# Patient Record
Sex: FEMALE | Race: WHITE | Employment: OTHER | URBAN - METROPOLITAN AREA
[De-identification: names, ages, dates, MRNs, and addresses within clinical notes are randomized per-mention and may not be internally consistent; named-entity substitution may affect disease eponyms.]

---

## 2017-05-03 RX ORDER — METHYLPHENIDATE HYDROCHLORIDE 20 MG/1
20 TABLET ORAL 3 TIMES DAILY
COMMUNITY
End: 2022-09-20

## 2017-05-08 ENCOUNTER — ANESTHESIA EVENT (OUTPATIENT)
Dept: ENDOSCOPY | Age: 72
End: 2017-05-08
Payer: MEDICARE

## 2017-05-09 ENCOUNTER — HOSPITAL ENCOUNTER (OUTPATIENT)
Age: 72
Setting detail: OUTPATIENT SURGERY
Discharge: HOME | End: 2017-05-09
Attending: INTERNAL MEDICINE | Admitting: INTERNAL MEDICINE
Payer: MEDICARE

## 2017-05-09 ENCOUNTER — ANESTHESIA (OUTPATIENT)
Dept: ENDOSCOPY | Age: 72
End: 2017-05-09
Payer: MEDICARE

## 2017-05-09 VITALS
RESPIRATION RATE: 19 BRPM | BODY MASS INDEX: 32.2 KG/M2 | TEMPERATURE: 97.5 F | DIASTOLIC BLOOD PRESSURE: 62 MMHG | HEART RATE: 76 BPM | SYSTOLIC BLOOD PRESSURE: 120 MMHG | HEIGHT: 62 IN | WEIGHT: 175 LBS | OXYGEN SATURATION: 100 %

## 2017-05-09 LAB — GLUCOSE BLD STRIP.AUTO-MCNC: 115 MG/DL (ref 70–110)

## 2017-05-09 PROCEDURE — 74011250636 HC RX REV CODE- 250/636: Performed by: NURSE ANESTHETIST, CERTIFIED REGISTERED

## 2017-05-09 PROCEDURE — 76040000019: Performed by: INTERNAL MEDICINE

## 2017-05-09 PROCEDURE — 74011250636 HC RX REV CODE- 250/636

## 2017-05-09 PROCEDURE — 82962 GLUCOSE BLOOD TEST: CPT

## 2017-05-09 PROCEDURE — 76060000031 HC ANESTHESIA FIRST 0.5 HR: Performed by: INTERNAL MEDICINE

## 2017-05-09 RX ORDER — MAGNESIUM SULFATE 100 %
4 CRYSTALS MISCELLANEOUS AS NEEDED
Status: DISCONTINUED | OUTPATIENT
Start: 2017-05-09 | End: 2017-05-09 | Stop reason: HOSPADM

## 2017-05-09 RX ORDER — SODIUM CHLORIDE 0.9 % (FLUSH) 0.9 %
5-10 SYRINGE (ML) INJECTION AS NEEDED
Status: DISCONTINUED | OUTPATIENT
Start: 2017-05-09 | End: 2017-05-09 | Stop reason: HOSPADM

## 2017-05-09 RX ORDER — SODIUM CHLORIDE 0.9 % (FLUSH) 0.9 %
5-10 SYRINGE (ML) INJECTION EVERY 8 HOURS
Status: DISCONTINUED | OUTPATIENT
Start: 2017-05-09 | End: 2017-05-09 | Stop reason: HOSPADM

## 2017-05-09 RX ORDER — PROPOFOL 10 MG/ML
INJECTION, EMULSION INTRAVENOUS AS NEEDED
Status: DISCONTINUED | OUTPATIENT
Start: 2017-05-09 | End: 2017-05-09 | Stop reason: HOSPADM

## 2017-05-09 RX ORDER — SODIUM CHLORIDE, SODIUM LACTATE, POTASSIUM CHLORIDE, CALCIUM CHLORIDE 600; 310; 30; 20 MG/100ML; MG/100ML; MG/100ML; MG/100ML
75 INJECTION, SOLUTION INTRAVENOUS CONTINUOUS
Status: DISCONTINUED | OUTPATIENT
Start: 2017-05-09 | End: 2017-05-09 | Stop reason: HOSPADM

## 2017-05-09 RX ORDER — DEXTROMETHORPHAN/PSEUDOEPHED 2.5-7.5/.8
1.2 DROPS ORAL
Status: CANCELLED | OUTPATIENT
Start: 2017-05-09

## 2017-05-09 RX ORDER — EPINEPHRINE 0.1 MG/ML
1 INJECTION INTRACARDIAC; INTRAVENOUS
Status: CANCELLED | OUTPATIENT
Start: 2017-05-09 | End: 2017-05-09

## 2017-05-09 RX ORDER — FAMOTIDINE 10 MG/ML
20 INJECTION INTRAVENOUS ONCE
Status: DISCONTINUED | OUTPATIENT
Start: 2017-05-09 | End: 2017-05-09 | Stop reason: HOSPADM

## 2017-05-09 RX ORDER — SODIUM CHLORIDE 0.9 % (FLUSH) 0.9 %
5-10 SYRINGE (ML) INJECTION AS NEEDED
Status: CANCELLED | OUTPATIENT
Start: 2017-05-09 | End: 2017-05-09

## 2017-05-09 RX ORDER — ATROPINE SULFATE 0.1 MG/ML
0.5 INJECTION INTRAVENOUS
Status: CANCELLED | OUTPATIENT
Start: 2017-05-09 | End: 2017-05-09

## 2017-05-09 RX ORDER — NALOXONE HYDROCHLORIDE 0.4 MG/ML
0.4 INJECTION, SOLUTION INTRAMUSCULAR; INTRAVENOUS; SUBCUTANEOUS
Status: CANCELLED | OUTPATIENT
Start: 2017-05-09 | End: 2017-05-09

## 2017-05-09 RX ORDER — FLUMAZENIL 0.1 MG/ML
0.2 INJECTION INTRAVENOUS
Status: CANCELLED | OUTPATIENT
Start: 2017-05-09 | End: 2017-05-09

## 2017-05-09 RX ORDER — SODIUM CHLORIDE 0.9 % (FLUSH) 0.9 %
5-10 SYRINGE (ML) INJECTION EVERY 8 HOURS
Status: CANCELLED | OUTPATIENT
Start: 2017-05-09 | End: 2017-05-09

## 2017-05-09 RX ORDER — DEXTROSE 50 % IN WATER (D50W) INTRAVENOUS SYRINGE
25-50 AS NEEDED
Status: DISCONTINUED | OUTPATIENT
Start: 2017-05-09 | End: 2017-05-09 | Stop reason: HOSPADM

## 2017-05-09 RX ORDER — INSULIN LISPRO 100 [IU]/ML
INJECTION, SOLUTION INTRAVENOUS; SUBCUTANEOUS ONCE
Status: DISCONTINUED | OUTPATIENT
Start: 2017-05-09 | End: 2017-05-09 | Stop reason: HOSPADM

## 2017-05-09 RX ADMIN — PROPOFOL 20 MG: 10 INJECTION, EMULSION INTRAVENOUS at 12:44

## 2017-05-09 RX ADMIN — PROPOFOL 50 MG: 10 INJECTION, EMULSION INTRAVENOUS at 12:32

## 2017-05-09 RX ADMIN — PROPOFOL 20 MG: 10 INJECTION, EMULSION INTRAVENOUS at 12:35

## 2017-05-09 RX ADMIN — SODIUM CHLORIDE, SODIUM LACTATE, POTASSIUM CHLORIDE, AND CALCIUM CHLORIDE 75 ML/HR: 600; 310; 30; 20 INJECTION, SOLUTION INTRAVENOUS at 12:18

## 2017-05-09 RX ADMIN — PROPOFOL 100 MG: 10 INJECTION, EMULSION INTRAVENOUS at 12:29

## 2017-05-09 RX ADMIN — PROPOFOL 20 MG: 10 INJECTION, EMULSION INTRAVENOUS at 12:47

## 2017-05-09 RX ADMIN — PROPOFOL 20 MG: 10 INJECTION, EMULSION INTRAVENOUS at 12:38

## 2017-05-09 RX ADMIN — PROPOFOL 20 MG: 10 INJECTION, EMULSION INTRAVENOUS at 12:41

## 2017-05-09 NOTE — ANESTHESIA POSTPROCEDURE EVALUATION
Post-Anesthesia Evaluation and Assessment    Patient: Orlando Cruz MRN: [id number]  SSN: [id number]    YOB: 1945  Age: 71 y.o. Sex: female       Cardiovascular Function/Vital Signs  Visit Vitals    /56    Pulse 79    Temp 36.4 °C (97.5 °F)    Resp 18    Ht 5' 2\" (1.575 m)    Wt 79.4 kg (175 lb)    SpO2 100%    BMI 32.01 kg/m2       Patient is status post MAC anesthesia for Procedure(s):  COLONOSCOPY. Nausea/Vomiting: None    Postoperative hydration reviewed and adequate. Pain:  Pain Scale 1: Numeric (0 - 10) (05/09/17 1310)  Pain Intensity 1: 0 (05/09/17 1310)   Managed    Neurological Status: At baseline    Mental Status and Level of Consciousness: Arousable    Pulmonary Status:   O2 Device: Room air (05/09/17 1310)   Adequate oxygenation and airway patent    Complications related to anesthesia: None    Post-anesthesia assessment completed.  No concerns    Signed By: Falguni Knowles MD     May 9, 2017

## 2017-05-09 NOTE — DISCHARGE INSTRUCTIONS
Colonoscopy: What to Expect at [address] [place] [address]  After you have a colonoscopy, you will stay at the clinic for 1 to 2 hours until the medicines wear off. Then you can go home. But you will need to arrange for a ride. Your doctor will tell you when you can eat and do your other usual activities. Your doctor will talk to you about when you will need your next colonoscopy. Your doctor can help you decide how often you need to be checked. This will depend on the results of your test and your risk for colorectal cancer. After the test, you may be bloated or have gas pains. You may need to pass gas. If a biopsy was done or a polyp was removed, you may have streaks of blood in your stool (feces) for a few days. This care sheet gives you a general idea about how long it will take for you to recover. But each person recovers at a different pace. Follow the steps below to get better as quickly as possible. How can you care for yourself at home? Activity  · Rest when you feel tired. · You can do your normal activities when it feels okay to do so. Diet  · Follow your doctor's directions for eating. · Unless your doctor has told you not to, drink plenty of fluids. This helps to replace the fluids that were lost during the colon prep. · Do not drink alcohol. Medicines  · If polyps were removed or a biopsy was done during the test, your doctor may tell you not to take aspirin or other anti-inflammatory medicines for a few days. These include ibuprofen (Advil, Motrin) and naproxen (Aleve). Other instructions  · For your safety, do not drive or operate machinery until the medicine wears off and you can think clearly. Your doctor may tell you not to drive or operate machinery until the day after your test.  · Do not sign legal documents or make major decisions until the medicine wears off and you can think clearly. The anesthesia can make it hard for you to fully understand what you are agreeing to.   Follow-up care is a key part of your treatment and safety. Be sure to make and go to all appointments, and call your doctor if you are having problems. It's also a good idea to know your test results and keep a list of the medicines you take. When should you call for help? Call 911 anytime you think you may need emergency care. For example, call if:  · You passed out (lost consciousness). · You pass maroon or bloody stools. · You have severe belly pain. Call your doctor now or seek immediate medical care if:  · Your stools are black and tarlike. · Your stools have streaks of blood, but you did not have a biopsy or any polyps removed. · You have belly pain, or your belly is swollen and firm. · You vomit. · You have a fever. · You are very dizzy. Watch closely for changes in your health, and be sure to contact your doctor if you have any problems. Where can you learn more? Go to [employer].be  Enter E264 in the search box to learn more about \"Colonoscopy: What to Expect at Home. \"   © 8296-9504 Healthwise, Incorporated. Care instructions adapted under license by Sreedhar Lucero (which disclaims liability or warranty for this information). This care instruction is for use with your licensed healthcare professional. If you have questions about a medical condition or this instruction, always ask your healthcare professional. [address] any warranty or liability for your use of this information. Content Version: 84.9.173109; Current as of: November 14, 2014      DISCHARGE SUMMARY from Nurse     POST-PROCEDURE INSTRUCTIONS:    Call your Physician if you:  ? Observe any excess bleeding. ? Develop a temperature over 100.5o F.  ? Experience abdominal, shoulder or chest pain. ? Notice any signs of decreased circulation or nerve impairment to an extremity such as a change in color, persistent numbness, tingling, coldness or increase in pain. ?  Vomit blood or you have nausea and vomiting lasting longer than 4 hours. ? Are unable to take medications. ? Are unable to urinate within 8 hours after discharge following general anesthesia or intravenous sedation. For the next 24 hours after receiving general anesthesia or intravenous sedation, or while taking prescription Narcotics, limit your activities:  ? Do NOT drive a motor vehicle, operate hazard machinery or power tools, or perform tasks that require coordination. The medication you received during your procedure may have some effect on your mental awareness. ? Do NOT make important personal or business decisions. The medication you received during your procedure may have some effect on your mental awareness. ? Do NOT drink alcoholic beverages. These drinks do not mix well with the medications that have been given to you. ? Upon discharge from the hospital, you must be accompanied by a responsible adult. ? Resume your diet as directed by your physician. ? Resume medications as your physician has prescribed. ? Please give a list of your current medications to your Primary Care Provider. ? Please update this list whenever your medications are discontinued, doses are changed, or new medications (including over-the-counter products) are added. ? Please carry medication information at all times in case of emergency situations. These are general instructions for a healthy lifestyle:    No smoking/ No tobacco products/ Avoid exposure to second hand smoke.  Surgeon General's Warning:  Quitting smoking now greatly reduces serious risk to your health. Obesity, smoking, and a sedentary lifestyle greatly increase your risk for illness.    A healthy diet, regular physical exercise & weight monitoring are important for maintaining a healthy lifestyle   You may be retaining fluid if you have a history of heart failure or if you experience any of the following symptoms:  Weight gain of 3 pounds or more overnight or 5 pounds in a week, increased swelling in our hands or feet or shortness of breath while lying flat in bed. Please call your doctor as soon as you notice any of these symptoms; do not wait until your next office visit. Recognize signs and symptoms of STROKE:  F  -  Face looks uneven  A  -  Arms unable to move or move unevenly  S  -  Speech slurred or non-existent  T  -  Time to call 911 - as soon as signs and symptoms begin - DO NOT go back to bed or wait to see If you get better - TIME IS BRAIN. Colorectal Screening   Colorectal cancer almost always develops from precancerous polyps (abnormal growths) in the colon or rectum. Screening tests can find precancerous polyps, so that they can be removed before they turn into cancer. Screening tests can also find colorectal cancer early, when treatment works best.  [facility] Speak with your physician about when you should begin screening and how often you should be tested. Additional Information    If you have questions, please call [phone]. Remember, [employer] is NOT to be used for urgent needs. For medical emergencies, dial 911. Educational references and/or instructions provided during this visit included:    Normal Colonoscopy      APPOINTMENTS:    Please make a follow-up appointment with your physician. Discharge information has been reviewed with the patient. The patient verbalized understanding.

## 2017-05-09 NOTE — H&P
History and Physical reviewed; I have examined the patient and there are no pertinent changes. Veronica Rosenberg MD, MD   12:24 PM 5/9/2017  [facility], [address] [place] [address]  [email or web address]

## 2017-05-09 NOTE — IP AVS SNAPSHOT
[address] [facility] [address] 
 
 
 [address] [id number] [phone] Patient: Claudean Gate MRN: [id number] NKX:[id number][birth year]44/[id number] You are allergic to the following No active allergies Recent Documentation Height Weight BMI OB Status Smoking Status 1.575 m 79.4 kg 32.01 kg/m2 Hysterectomy Never Smoker Emergency Contacts Name Discharge Info Relation Home Work Mobile   [address] CAREGIVER [3] Spouse [3]   [phone] About your hospitalization You were admitted on:  May 9, 2017 You last received care in the:  [facility] You were discharged on:  May 9, 2017 Unit phone number:  [phone] Why you were hospitalized Your primary diagnosis was:  Not on File Providers Seen During Your Hospitalizations Provider Role Specialty Primary office phone Sheila Olson MD Attending Provider Gastroenterology [phone] Your Primary Care Physician (PCP) Primary Care Physician Office Phone Office Fax Veronica Speed [phone] Follow-up Information Follow up With Details Comments Contact Info Sheila Olson MD   [place] [address] [address] [facility] 
[phone] Your Appointments Tuesday May 09, 2017 COLONOSCOPY with Sheila Olson MD  
[facility] ([address] [place] [address] [phone] Current Discharge Medication List  
  
ASK your doctor about these medications Dose & Instructions Dispensing Information Comments Morning Noon Evening Bedtime ACTOS 15 mg tablet Generic drug:  pioglitazone Your last dose was: Your next dose is: Take  by mouth. Refills:  0  
     
   
   
   
  
 BYSTOLIC 5 mg tablet Generic drug:  nebivolol Your last dose was: Your next dose is: Dose:  5 mg Take 5 mg by mouth daily. Refills:  0 DULoxetine 60 mg capsule Commonly known as:  CYMBALTA Your last dose was: Your next dose is:    
   
   
 Dose:  60 mg Take 1 Cap by mouth daily. Quantity:  30 Cap Refills:  5  
     
   
   
   
  
 levothyroxine 112 mcg tablet Commonly known as:  SYNTHROID Your last dose was: Your next dose is:    
   
   
 Dose:  112 mcg Take 112 mcg by mouth Daily (before breakfast). Refills:  0  
     
   
   
   
  
 magnesium oxide 400 mg tablet Commonly known as:  MAG-OX Your last dose was: Your next dose is:    
   
   
 Dose:  400 mg Take 400 mg by mouth daily. Refills:  0 NexIUM 40 mg capsule Generic drug:  esomeprazole Your last dose was: Your next dose is: Take  by mouth daily. Refills:  0  
     
   
   
   
  
 ondansetron 4 mg disintegrating tablet Commonly known as:  ZOFRAN ODT Your last dose was: Your next dose is:    
   
   
 Dose:  4 mg Take 1 Tab by mouth every eight (8) hours as needed for Nausea. Quantity:  12 Tab Refills:  0 PREMARIN PO Your last dose was: Your next dose is: Take  by mouth. Refills:  0  
     
   
   
   
  
 RITALIN 20 mg tablet Generic drug:  methylphenidate Your last dose was: Your next dose is:    
   
   
 Dose:  20 mg Take 20 mg by mouth three (3) times daily. Refills:  0  
     
   
   
   
  
 VITAMIN D3 1,000 unit tablet Generic drug:  cholecalciferol Your last dose was: Your next dose is:    
   
   
 Dose:  1000 Units Take 1,000 Units by mouth daily. Refills:  0 Discharge Instructions Colonoscopy: What to Expect at [facility] Your Recovery After you have a colonoscopy, you will stay at the clinic for 1 to 2 hours until the medicines wear off. Then you can go home. But you will need to arrange for a ride. Your doctor will tell you when you can eat and do your other usual activities. Your doctor will talk to you about when you will need your next colonoscopy. Your doctor can help you decide how often you need to be checked. This will depend on the results of your test and your risk for colorectal cancer. After the test, you may be bloated or have gas pains. You may need to pass gas. If a biopsy was done or a polyp was removed, you may have streaks of blood in your stool (feces) for a few days. This care sheet gives you a general idea about how long it will take for you to recover. But each person recovers at a different pace. Follow the steps below to get better as quickly as possible. How can you care for yourself at home? Activity · Rest when you feel tired. · You can do your normal activities when it feels okay to do so. Diet · Follow your doctor's directions for eating. · Unless your doctor has told you not to, drink plenty of fluids. This helps to replace the fluids that were lost during the colon prep. · Do not drink alcohol. Medicines · If polyps were removed or a biopsy was done during the test, your doctor may tell you not to take aspirin or other anti-inflammatory medicines for a few days. These include ibuprofen (Advil, Motrin) and naproxen (Aleve). Other instructions · For your safety, do not drive or operate machinery until the medicine wears off and you can think clearly. Your doctor may tell you not to drive or operate machinery until the day after your test. 
· Do not sign legal documents or make major decisions until the medicine wears off and you can think clearly. The anesthesia can make it hard for you to fully understand what you are agreeing to. Follow-up care is a key part of your treatment and safety.  Be sure to make and go to all appointments, and call your doctor if you are having problems. It's also a good idea to know your test results and keep a list of the medicines you take. When should you call for help? Call 911 anytime you think you may need emergency care. For example, call if: 
· You passed out (lost consciousness). · You pass maroon or bloody stools. · You have severe belly pain. Call your doctor now or seek immediate medical care if: 
· Your stools are black and tarlike. · Your stools have streaks of blood, but you did not have a biopsy or any polyps removed. · You have belly pain, or your belly is swollen and firm. · You vomit. · You have a fever. · You are very dizzy. Watch closely for changes in your health, and be sure to contact your doctor if you have any problems. Where can you learn more? Go to [employer].be Enter E264 in the search box to learn more about \"Colonoscopy: What to Expect at Home. \"  
© 2814-1620 Healthwise, Incorporated. Care instructions adapted under license by Jose De Jesus Pacheco (which disclaims liability or warranty for this information). This care instruction is for use with your licensed healthcare professional. If you have questions about a medical condition or this instruction, always ask your healthcare professional. [address] any warranty or liability for your use of this information. Content Version: 92.1.225437; Current as of: November 14, 2014 DISCHARGE SUMMARY from Nurse POST-PROCEDURE INSTRUCTIONS: 
 
Call your Physician if you: 
? Observe any excess bleeding. ? Develop a temperature over 100.5o F. 
? Experience abdominal, shoulder or chest pain. ? Notice any signs of decreased circulation or nerve impairment to an extremity such as a change in color, persistent numbness, tingling, coldness or increase in pain. ? Vomit blood or you have nausea and vomiting lasting longer than 4 hours. ? Are unable to take medications. ? Are unable to urinate within 8 hours after discharge following general anesthesia or intravenous sedation. For the next 24 hours after receiving general anesthesia or intravenous sedation, or while taking prescription Narcotics, limit your activities: 
? Do NOT drive a motor vehicle, operate hazard machinery or power tools, or perform tasks that require coordination. The medication you received during your procedure may have some effect on your mental awareness. ? Do NOT make important personal or business decisions. The medication you received during your procedure may have some effect on your mental awareness. ? Do NOT drink alcoholic beverages. These drinks do not mix well with the medications that have been given to you. ? Upon discharge from the hospital, you must be accompanied by a responsible adult. ? Resume your diet as directed by your physician. ? Resume medications as your physician has prescribed. ? Please give a list of your current medications to your Primary Care Provider. ? Please update this list whenever your medications are discontinued, doses are changed, or new medications (including over-the-counter products) are added. ? Please carry medication information at all times in case of emergency situations. These are general instructions for a healthy lifestyle: No smoking/ No tobacco products/ Avoid exposure to second hand smoke. ? Surgeon General's Warning:  Quitting smoking now greatly reduces serious risk to your health. Obesity, smoking, and a sedentary lifestyle greatly increase your risk for illness. ? A healthy diet, regular physical exercise & weight monitoring are important for maintaining a healthy lifestyle ?  You may be retaining fluid if you have a history of heart failure or if you experience any of the following symptoms:  Weight gain of 3 pounds or more overnight or 5 pounds in a week, increased swelling in our hands or feet or shortness of breath while lying flat in bed. Please call your doctor as soon as you notice any of these symptoms; do not wait until your next office visit. Recognize signs and symptoms of STROKE: 
F  -  Face looks uneven A  -  Arms unable to move or move unevenly S  -  Speech slurred or non-existent T  -  Time to call 911 - as soon as signs and symptoms begin - DO NOT go back to bed or wait to see If you get better - TIME IS BRAIN. Colorectal Screening ? Colorectal cancer almost always develops from precancerous polyps (abnormal growths) in the colon or rectum. Screening tests can find precancerous polyps, so that they can be removed before they turn into cancer. Screening tests can also find colorectal cancer early, when treatment works best. 
? Speak with your physician about when you should begin screening and how often you should be tested. Additional Information If you have questions, please call [phone]. Remember, [employer] is NOT to be used for urgent needs. For medical emergencies, dial 911. Educational references and/or instructions provided during this visit included: 
 
Normal Colonoscopy APPOINTMENTS: 
 
Please make a follow-up appointment with your physician. Discharge information has been reviewed with the patient. The patient verbalized understanding. Discharge Orders None Introducing [facility]! Dear [facility]: 
Thank you for requesting a Order[employer] account. Our records indicate that you already have an active OrderMotion account. You can access your account anytime at [email or web address]. [employer] Search/[employer] Did you know that you can access your hospital and ER discharge instructions at any time in OrderMotion? You can also review all of your test results from your hospital stay or ER visit. Additional Information If you have questions, please visit the Frequently Asked Questions section of the [employer] website at [email or web address] Remember, MyChart is NOT to be used for urgent needs. For medical emergencies, dial 911. Now available from your iPhone and Android! General Information Please provide this summary of care documentation to your next provider. Patient Signature:  ____________________________________________________________ Date:  ____________________________________________________________  
  
Howard Can Provider Signature:  ____________________________________________________________ Date:  ____________________________________________________________

## 2017-05-09 NOTE — PROCEDURES
[place]  [facility], [address]      Brief Procedure Note    Rodrigues Muck  1945  [id number]    Date of Procedure: 5/9/2017    Preoperative diagnosis: Blood in stool / hematochezia  564.09 - K59.00,  Constipation, chronic    Postoperative diagnosis:  Normal mucosa    Type of Anesthesia: MAC (monitered anesthesia care)    Description of Findings: same as post op dx    Procedure: Procedure(s):  COLONOSCOPY    :  Dr. Ava Paniagua MD    Assistant(s): [unfilled]    Type of Anesthesia:MAC     EBL:None    Specimens: * No specimens in log *    Findings: See printed and scanned procedure note    Complications: None    Dr. Ava Paniagua MD  5/9/2017  12:55 PM

## 2019-05-21 ENCOUNTER — HOSPITAL ENCOUNTER (OUTPATIENT)
Age: 74
Setting detail: OUTPATIENT SURGERY
Discharge: HOME | End: 2019-05-21
Attending: INTERNAL MEDICINE | Admitting: INTERNAL MEDICINE
Payer: MEDICARE

## 2019-05-21 VITALS
SYSTOLIC BLOOD PRESSURE: 91 MMHG | DIASTOLIC BLOOD PRESSURE: 32 MMHG | WEIGHT: 180 LBS | BODY MASS INDEX: 33.13 KG/M2 | RESPIRATION RATE: 16 BRPM | HEART RATE: 79 BPM | TEMPERATURE: 98.3 F | HEIGHT: 62 IN | OXYGEN SATURATION: 97 %

## 2019-05-21 LAB — GLUCOSE BLD STRIP.AUTO-MCNC: 128 MG/DL (ref 70–110)

## 2019-05-21 PROCEDURE — 77030037006 HC FCPS BIOP ENDOSC DISP OCOA -A: Performed by: INTERNAL MEDICINE

## 2019-05-21 PROCEDURE — 82962 GLUCOSE BLOOD TEST: CPT

## 2019-05-21 PROCEDURE — 88305 TISSUE EXAM BY PATHOLOGIST: CPT

## 2019-05-21 PROCEDURE — 74011250636 HC RX REV CODE- 250/636: Performed by: INTERNAL MEDICINE

## 2019-05-21 PROCEDURE — 76040000019: Performed by: INTERNAL MEDICINE

## 2019-05-21 RX ORDER — MIDAZOLAM HYDROCHLORIDE 1 MG/ML
.25-6 INJECTION, SOLUTION INTRAMUSCULAR; INTRAVENOUS
Status: DISCONTINUED | OUTPATIENT
Start: 2019-05-21 | End: 2019-05-21 | Stop reason: HOSPADM

## 2019-05-21 RX ORDER — NALOXONE HYDROCHLORIDE 0.4 MG/ML
0.4 INJECTION, SOLUTION INTRAMUSCULAR; INTRAVENOUS; SUBCUTANEOUS
Status: DISCONTINUED | OUTPATIENT
Start: 2019-05-21 | End: 2019-05-21 | Stop reason: HOSPADM

## 2019-05-21 RX ORDER — SODIUM CHLORIDE 0.9 % (FLUSH) 0.9 %
5-40 SYRINGE (ML) INJECTION EVERY 8 HOURS
Status: DISCONTINUED | OUTPATIENT
Start: 2019-05-21 | End: 2019-05-21 | Stop reason: HOSPADM

## 2019-05-21 RX ORDER — DEXTROMETHORPHAN/PSEUDOEPHED 2.5-7.5/.8
1.2 DROPS ORAL
Status: DISCONTINUED | OUTPATIENT
Start: 2019-05-21 | End: 2019-05-21 | Stop reason: HOSPADM

## 2019-05-21 RX ORDER — SODIUM CHLORIDE 9 MG/ML
25 INJECTION, SOLUTION INTRAVENOUS CONTINUOUS
Status: DISCONTINUED | OUTPATIENT
Start: 2019-05-21 | End: 2019-05-21 | Stop reason: HOSPADM

## 2019-05-21 RX ORDER — FLUMAZENIL 0.1 MG/ML
0.2 INJECTION INTRAVENOUS
Status: DISCONTINUED | OUTPATIENT
Start: 2019-05-21 | End: 2019-05-21 | Stop reason: HOSPADM

## 2019-05-21 RX ORDER — EPINEPHRINE 0.1 MG/ML
1 INJECTION INTRACARDIAC; INTRAVENOUS
Status: DISCONTINUED | OUTPATIENT
Start: 2019-05-21 | End: 2019-05-21 | Stop reason: HOSPADM

## 2019-05-21 RX ORDER — SODIUM CHLORIDE 0.9 % (FLUSH) 0.9 %
5-40 SYRINGE (ML) INJECTION AS NEEDED
Status: DISCONTINUED | OUTPATIENT
Start: 2019-05-21 | End: 2019-05-21 | Stop reason: HOSPADM

## 2019-05-21 RX ORDER — ATROPINE SULFATE 0.1 MG/ML
0.5 INJECTION INTRAVENOUS
Status: DISCONTINUED | OUTPATIENT
Start: 2019-05-21 | End: 2019-05-21 | Stop reason: HOSPADM

## 2019-05-21 RX ORDER — FENTANYL CITRATE 50 UG/ML
25-200 INJECTION, SOLUTION INTRAMUSCULAR; INTRAVENOUS
Status: DISCONTINUED | OUTPATIENT
Start: 2019-05-21 | End: 2019-05-21 | Stop reason: HOSPADM

## 2019-05-21 RX ADMIN — SODIUM CHLORIDE 25 ML/HR: 900 INJECTION, SOLUTION INTRAVENOUS at 11:11

## 2019-05-21 NOTE — PROCEDURES
[place]  [address] [facility], [address]      Brief Procedure Note    Tobin Mcguire  1945  [id number]    Date of Procedure: 5/21/2019    Preoperative diagnosis: V85.34 - Z68.34,  Body mass index 34.0 - 34.9, adult  787.22 - R13.12,  Dysphagia, oropharyngeal phase  530.81 -  K22.70,  Reeves's esophagus  780.57 - G47.30,  Sleep apnea    Postoperative diagnosis:  Esophageal bxs r/o barretts, dilataion with 54F, hiatal hernia      Type of Anesthesia: ivc    Description of Findings: same as post op dx    Procedure: Procedure(s):  UPPER ENDOSCOPY / dilation / biopsies    :  Dr. Lazara Garcia MD    Assistant(s): [unfilled]    Type of Anesthesia:ivc    EBL:None    Specimens:   ID Type Source Tests Collected by Time Destination   1 : Esophageal bxs Preservative Esophagus  Victor Manuel Kay MD 5/21/2019 1223 Pathology       Findings: See printed and scanned procedure note    Complications: None    Dr. Lazara Garcia MD  5/21/2019  12:26 PM

## 2019-05-21 NOTE — DISCHARGE INSTRUCTIONS
Upper GI Endoscopy: What to Expect at [address] [place] [address]  After you have an endoscopy, you will stay at the hospital or clinic for 1 to 2 hours. This will allow the medicine to wear off. You will be able to go home after your doctor or nurse checks to make sure you are not having any problems. You may have to stay overnight if you had treatment during the test. You may have a sore throat for a day or two after the test.  This care sheet gives you a general idea about what to expect after the test.  How can you care for yourself at home? Activity  · Rest as much as you need to after you go home. · You should be able to go back to your usual activities the day after the test.  Diet  · Follow your doctor's directions for eating after the test.  · Drink plenty of fluids (unless your doctor has told you not to). Medications  · If you have a sore throat the day after the test, use an over-the-counter spray to numb your throat. Follow-up care is a key part of your treatment and safety. Be sure to make and go to all appointments, and call your doctor if you are having problems. It's also a good idea to know your test results and keep a list of the medicines you take. When should you call for help? Call 911 anytime you think you may need emergency care. For example, call if:  · You passed out (lost consciousness). · You cough up blood. · You vomit blood or what looks like coffee grounds. · You pass maroon or very bloody stools. Call your doctor now or seek immediate medical care if:  · You have trouble swallowing. · You have belly pain. · Your stools are black and tarlike or have streaks of blood. · You are sick to your stomach or cannot keep fluids down. Watch closely for changes in your health, and be sure to contact your doctor if:  · Your throat still hurts after a day or two. · You do not get better as expected. Where can you learn more?    Go to [employer]  Enter J454 in the search box to learn more about \"Upper GI Endoscopy: What to Expect at Home. \"   © 0918-8836 Healthwise, [employer]. Care instructions adapted under license by [employer] (which disclaims liability or warranty for this information). This care instruction is for use with your licensed healthcare professional. If you have questions about a medical condition or this instruction, always ask your healthcare professional. [address]  any warranty or liability for your use of this information. Content Version: 43.0.338089; Current as of: November 14, 2014    DISCHARGE SUMMARY from Nurse     POST-PROCEDURE INSTRUCTIONS:    Call your Physician if you:  ? Observe any excess bleeding. ? Develop a temperature over 100.5o F.  ? Experience abdominal, shoulder or chest pain. ? Notice any signs of decreased circulation or nerve impairment to an extremity such as a change in color, persistent numbness, tingling, coldness or increase in pain. ? Vomit blood or you have nausea and vomiting lasting longer than 4 hours. ? Are unable to take medications. ? Are unable to urinate within 8 hours after discharge following general anesthesia or intravenous sedation. For the next 24 hours after receiving general anesthesia or intravenous sedation, or while taking prescription Narcotics, limit your activities:  ? Do NOT drive a motor vehicle, operate hazard machinery or power tools, or perform tasks that require coordination. The medication you received during your procedure may have some effect on your mental awareness. ? Do NOT make important personal or business decisions. The medication you received during your procedure may have some effect on your mental awareness. ? Do NOT drink alcoholic beverages. These drinks do not mix well with the medications that have been given to you. ? Upon discharge from the hospital, you must be accompanied by a responsible adult. ?  Resume your diet as directed by your physician. ? Resume medications as your physician has prescribed. ? Please give a list of your current medications to your Primary Care Provider. ? Please update this list whenever your medications are discontinued, doses are changed, or new medications (including over-the-counter products) are added. ? Please carry medication information at all times in case of emergency situations. These are general instructions for a healthy lifestyle:    No smoking/ No tobacco products/ Avoid exposure to second hand smoke.  Surgeon General's Warning:  Quitting smoking now greatly reduces serious risk to your health. Obesity, smoking, and a sedentary lifestyle greatly increase your risk for illness.  A healthy diet, regular physical exercise & weight monitoring are important for maintaining a healthy lifestyle   You may be retaining fluid if you have a history of heart failure or if you experience any of the following symptoms:  Weight gain of 3 pounds or more overnight or 5 pounds in a week, increased swelling in our hands or feet or shortness of breath while lying flat in bed. Please call your doctor as soon as you notice any of these symptoms; do not wait until your next office visit. Recognize signs and symptoms of STROKE:  F  -  Face looks uneven  A  -  Arms unable to move or move unevenly  S  -  Speech slurred or non-existent  T  -  Time to call 911 - as soon as signs and symptoms begin - DO NOT go back to bed or wait to see If you get better - TIME IS BRAIN. Colorectal Screening   Colorectal cancer almost always develops from precancerous polyps (abnormal growths) in the colon or rectum. Screening tests can find precancerous polyps, so that they can be removed before they turn into cancer.  Screening tests can also find colorectal cancer early, when treatment works best.  Cameron Esteves Speak with your physician about when you should begin screening and how often you should be tested  Cameron Esteves   Additional Information    If you have questions, please call [phone]. Remember, MyChart is NOT to be used for urgent needs. For medical emergencies, dial 911. Discharge information has been reviewed with the patient. The patient verbalized understanding.

## 2019-05-21 NOTE — H&P
History and Physical reviewed; I have examined the patient and there are no pertinent changes. Mary Rausch MD, MD   10:49 AM 5/21/2019  [facility], [address] [place] [address]  [email or web address]

## 2019-10-08 ENCOUNTER — HOSPITAL ENCOUNTER (OUTPATIENT)
Dept: ULTRASOUND IMAGING | Age: 74
Discharge: HOME | End: 2019-10-08
Attending: INTERNAL MEDICINE
Payer: MEDICARE

## 2019-10-08 ENCOUNTER — HOSPITAL ENCOUNTER (OUTPATIENT)
Dept: MAMMOGRAPHY | Age: 74
Discharge: HOME | End: 2019-10-08
Attending: INTERNAL MEDICINE
Payer: MEDICARE

## 2019-10-08 DIAGNOSIS — N64.4 MASTODYNIA: ICD-10-CM

## 2019-10-08 PROCEDURE — 77062 BREAST TOMOSYNTHESIS BI: CPT

## 2020-09-14 ENCOUNTER — HOSPITAL ENCOUNTER (OUTPATIENT)
Dept: LAB | Age: 75
Discharge: HOME | End: 2020-09-14
Payer: MEDICARE

## 2020-09-14 PROCEDURE — 87635 SARS-COV-2 COVID-19 AMP PRB: CPT

## 2020-09-14 PROCEDURE — 36415 COLL VENOUS BLD VENIPUNCTURE: CPT

## 2020-09-15 LAB — SARS-COV-2, COV2NT: NOT DETECTED

## 2020-09-15 RX ORDER — EXENATIDE 2 MG/.65ML
2 INJECTION, SUSPENSION, EXTENDED RELEASE SUBCUTANEOUS
COMMUNITY

## 2020-09-15 RX ORDER — ATORVASTATIN CALCIUM 40 MG/1
TABLET, FILM COATED ORAL DAILY
COMMUNITY
End: 2022-09-14

## 2020-09-16 ENCOUNTER — ANESTHESIA EVENT (OUTPATIENT)
Dept: ENDOSCOPY | Age: 75
End: 2020-09-16
Payer: MEDICARE

## 2020-09-17 ENCOUNTER — HOSPITAL ENCOUNTER (OUTPATIENT)
Age: 75
Setting detail: OUTPATIENT SURGERY
Discharge: HOME | End: 2020-09-17
Attending: INTERNAL MEDICINE | Admitting: INTERNAL MEDICINE
Payer: MEDICARE

## 2020-09-17 ENCOUNTER — ANESTHESIA (OUTPATIENT)
Dept: ENDOSCOPY | Age: 75
End: 2020-09-17
Payer: MEDICARE

## 2020-09-17 VITALS
HEIGHT: 62 IN | DIASTOLIC BLOOD PRESSURE: 74 MMHG | TEMPERATURE: 97 F | WEIGHT: 191 LBS | BODY MASS INDEX: 35.15 KG/M2 | OXYGEN SATURATION: 100 % | RESPIRATION RATE: 15 BRPM | SYSTOLIC BLOOD PRESSURE: 153 MMHG | HEART RATE: 80 BPM

## 2020-09-17 LAB
GLUCOSE BLD STRIP.AUTO-MCNC: 100 MG/DL (ref 70–110)
GLUCOSE BLD STRIP.AUTO-MCNC: 118 MG/DL (ref 70–110)

## 2020-09-17 PROCEDURE — 74011000250 HC RX REV CODE- 250: Performed by: NURSE ANESTHETIST, CERTIFIED REGISTERED

## 2020-09-17 PROCEDURE — 76060000031 HC ANESTHESIA FIRST 0.5 HR: Performed by: INTERNAL MEDICINE

## 2020-09-17 PROCEDURE — 82962 GLUCOSE BLOOD TEST: CPT

## 2020-09-17 PROCEDURE — 2709999900 HC NON-CHARGEABLE SUPPLY: Performed by: INTERNAL MEDICINE

## 2020-09-17 PROCEDURE — 74011250636 HC RX REV CODE- 250/636: Performed by: NURSE ANESTHETIST, CERTIFIED REGISTERED

## 2020-09-17 PROCEDURE — 77030019988 HC FCPS ENDOSC DISP BSC -B: Performed by: INTERNAL MEDICINE

## 2020-09-17 PROCEDURE — 77030008565 HC TBNG SUC IRR ERBE -B: Performed by: INTERNAL MEDICINE

## 2020-09-17 PROCEDURE — 76040000019: Performed by: INTERNAL MEDICINE

## 2020-09-17 PROCEDURE — 74011250637 HC RX REV CODE- 250/637: Performed by: NURSE ANESTHETIST, CERTIFIED REGISTERED

## 2020-09-17 RX ORDER — SODIUM CHLORIDE 0.9 % (FLUSH) 0.9 %
5-40 SYRINGE (ML) INJECTION AS NEEDED
Status: DISCONTINUED | OUTPATIENT
Start: 2020-09-17 | End: 2020-09-17 | Stop reason: HOSPADM

## 2020-09-17 RX ORDER — FAMOTIDINE 20 MG/1
20 TABLET, FILM COATED ORAL ONCE
Status: COMPLETED | OUTPATIENT
Start: 2020-09-17 | End: 2020-09-17

## 2020-09-17 RX ORDER — INSULIN LISPRO 100 [IU]/ML
INJECTION, SOLUTION INTRAVENOUS; SUBCUTANEOUS ONCE
Status: DISCONTINUED | OUTPATIENT
Start: 2020-09-17 | End: 2020-09-17 | Stop reason: HOSPADM

## 2020-09-17 RX ORDER — CARVEDILOL 6.25 MG/1
6.25 TABLET ORAL 2 TIMES DAILY
COMMUNITY
End: 2021-04-16 | Stop reason: ALTCHOICE

## 2020-09-17 RX ORDER — LIDOCAINE HYDROCHLORIDE 20 MG/ML
INJECTION, SOLUTION EPIDURAL; INFILTRATION; INTRACAUDAL; PERINEURAL AS NEEDED
Status: DISCONTINUED | OUTPATIENT
Start: 2020-09-17 | End: 2020-09-17 | Stop reason: HOSPADM

## 2020-09-17 RX ORDER — SODIUM CHLORIDE, SODIUM LACTATE, POTASSIUM CHLORIDE, CALCIUM CHLORIDE 600; 310; 30; 20 MG/100ML; MG/100ML; MG/100ML; MG/100ML
25 INJECTION, SOLUTION INTRAVENOUS CONTINUOUS
Status: DISCONTINUED | OUTPATIENT
Start: 2020-09-17 | End: 2020-09-17 | Stop reason: HOSPADM

## 2020-09-17 RX ORDER — DEXTROSE 50 % IN WATER (D50W) INTRAVENOUS SYRINGE
25-50 AS NEEDED
Status: DISCONTINUED | OUTPATIENT
Start: 2020-09-17 | End: 2020-09-17 | Stop reason: HOSPADM

## 2020-09-17 RX ORDER — SODIUM CHLORIDE 0.9 % (FLUSH) 0.9 %
5-40 SYRINGE (ML) INJECTION EVERY 8 HOURS
Status: DISCONTINUED | OUTPATIENT
Start: 2020-09-17 | End: 2020-09-17 | Stop reason: HOSPADM

## 2020-09-17 RX ORDER — MAGNESIUM SULFATE 100 %
4 CRYSTALS MISCELLANEOUS AS NEEDED
Status: DISCONTINUED | OUTPATIENT
Start: 2020-09-17 | End: 2020-09-17 | Stop reason: HOSPADM

## 2020-09-17 RX ORDER — SODIUM CHLORIDE, SODIUM LACTATE, POTASSIUM CHLORIDE, CALCIUM CHLORIDE 600; 310; 30; 20 MG/100ML; MG/100ML; MG/100ML; MG/100ML
50 INJECTION, SOLUTION INTRAVENOUS CONTINUOUS
Status: DISCONTINUED | OUTPATIENT
Start: 2020-09-17 | End: 2020-09-17 | Stop reason: HOSPADM

## 2020-09-17 RX ORDER — PROPOFOL 10 MG/ML
INJECTION, EMULSION INTRAVENOUS AS NEEDED
Status: DISCONTINUED | OUTPATIENT
Start: 2020-09-17 | End: 2020-09-17 | Stop reason: HOSPADM

## 2020-09-17 RX ADMIN — LIDOCAINE HYDROCHLORIDE 60 MG: 20 INJECTION, SOLUTION EPIDURAL; INFILTRATION; INTRACAUDAL; PERINEURAL at 12:40

## 2020-09-17 RX ADMIN — SODIUM CHLORIDE, SODIUM LACTATE, POTASSIUM CHLORIDE, AND CALCIUM CHLORIDE 25 ML/HR: 600; 310; 30; 20 INJECTION, SOLUTION INTRAVENOUS at 12:19

## 2020-09-17 RX ADMIN — PROPOFOL 40 MG: 10 INJECTION, EMULSION INTRAVENOUS at 12:40

## 2020-09-17 RX ADMIN — PROPOFOL 80 MG: 10 INJECTION, EMULSION INTRAVENOUS at 12:38

## 2020-09-17 RX ADMIN — FAMOTIDINE 20 MG: 20 TABLET ORAL at 12:20

## 2020-09-17 NOTE — ANESTHESIA PREPROCEDURE EVALUATION
Anesthetic History     History of awareness of surgery under anesthesia     Comments: During hysterectomy full recall and awakening       Risk Factors for Postoperative nausea/vomiting:       History of postoperative nausea/vomiting? NO       Female? YES       Motion sickness? NO       Intended opioid administration for postoperative analgesia? NO      Smoking Abstinence  Current Smoker? NO  Elective Surgery? YES  Seen preoperatively by anesthesiologist or proxy prior to day of surgery? YES  Pt abstained from smoking 24 hours prior to anesthesia? YES     Review of Systems / Medical History  Patient summary reviewed and pertinent labs reviewed    Pulmonary        Sleep apnea: No treatment           Neuro/Psych         Neuromuscular disease and psychiatric history    Comments: polymyositis     Jareth Currie  Cardiovascular    Hypertension: poorly controlled          Hyperlipidemia    Exercise tolerance: >4 METS  Comments: Pt denies recent CP or change in cardiac status.      GI/Hepatic/Renal     GERD: well controlled           Endo/Other    Diabetes: poorly controlled  Hypothyroidism: well controlled  Obesity and arthritis     Other Findings              Physical Exam    Airway  Mallampati: III  TM Distance: 4 - 6 cm  Neck ROM: normal range of motion        Cardiovascular    Rhythm: regular  Rate: normal         Dental    Dentition: Caps/crowns     Pulmonary                 Abdominal  GI exam deferred       Other Findings            Anesthetic Plan    ASA: 3  Anesthesia type: MAC          Induction: Intravenous  Anesthetic plan and risks discussed with: Patient

## 2020-09-17 NOTE — ANESTHESIA POSTPROCEDURE EVALUATION
Procedure(s):  UPPER ENDOSCOPY / dilation. MAC    Anesthesia Post Evaluation      Multimodal analgesia: multimodal analgesia used between 6 hours prior to anesthesia start to PACU discharge  Patient location during evaluation: bedside  Patient participation: complete - patient participated  Level of consciousness: awake  Pain management: adequate  Airway patency: patent  Anesthetic complications: no  Cardiovascular status: stable  Respiratory status: acceptable  Hydration status: acceptable  Post anesthesia nausea and vomiting:  controlled      INITIAL Post-op Vital signs:   Vitals Value Taken Time   /47 9/17/2020  1:21 PM   Temp 36.5 °C (97.7 °F) 9/17/2020 12:53 PM   Pulse 79 9/17/2020  1:24 PM   Resp 16 9/17/2020  1:24 PM   SpO2 98 % 9/17/2020  1:24 PM   Vitals shown include unvalidated device data.

## 2020-09-17 NOTE — H&P
[email or web address]  [phone]      GASTROENTEROLOGY Pre-Procedure H and P      Impression/Plan:   1. This patient is consented for an EGD for dysphagia    Addendum: All lab tests orders pertaining to the procedure have been ordered by the anesthesia personnel and results will be addressed by the anesthesia team  Chief Complaint: dysphagia      HPI:  Collin Pastor is a 77 y.o. female who is being is having an EGD for dysphagia  PMH:   Past Medical History:   Diagnosis Date    Adverse effect of anesthesia     woke up during hysterectomy. Placed back to sleep. 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 Caused by extreme stress    Hiatal hernia     HX OTHER MEDICAL     acute poor vision, double vision, need glasses due to [facility].  Hypertension     Hypothyroidism     Insomnia     Lyme disease 2008    Shingles 2008    Unspecified adverse effect of anesthesia     pt states she woke up during hysterectomy and put back to sleep.     Unspecified sleep apnea     not using cpap currently needs new mask       PSH:   Past Surgical History:   Procedure Laterality Date    COLONOSCOPY N/A 5/9/2017    COLONOSCOPY performed by Destiny Santana MD at [facility] HX CHOLECYSTECTOMY      HX HYSTERECTOMY  1982    complete    HX KNEE ARTHROSCOPY Left 11/2016       Social HX:   Social History     Socioeconomic History    Marital status:      Spouse name: Not on file    Number of children: Not on file    Years of education: Not on file    Highest education level: Not on file   Occupational History    Not on file   Social Needs    Financial resource strain: Not on file    Food insecurity     Worry: Not on file     Inability: Not on file    Transportation needs     Medical: Not on file     Non-medical: Not on file   Tobacco Use    Smoking status: Never Smoker    Smokeless tobacco: Never Used   Substance and Sexual Activity    Alcohol use: No    Drug use: No    Sexual activity: Not on file   Lifestyle    Physical activity     Days per week: Not on file     Minutes per session: Not on file    Stress: Not on file   Relationships    Social connections     Talks on phone: Not on file     Gets together: Not on file     Attends Anabaptism service: Not on file     Active member of club or organization: Not on file     Attends meetings of clubs or organizations: Not on file     Relationship status: Not on file    Intimate partner violence     Fear of current or ex partner: Not on file     Emotionally abused: Not on file     Physically abused: Not on file     Forced sexual activity: Not on file   Other Topics Concern    Not on file   Social History Narrative    Not on file       FHX:   Family History   Problem Relation Age of Onset    Hypertension Other     Diabetes Other     Cancer Other     Cancer Mother     Cancer Sister     Cancer Paternal Grandmother     Cancer Brother        Allergy:   No Known Allergies    Home Medications:     Medications Prior to Admission   Medication Sig    exenatide microspheres (Bydureon) 2 mg/0.65 mL pnij 2 mg by SubCUTAneous route every seven (7) days.  atorvastatin (LIPITOR) 40 mg tablet Take  by mouth daily.  metformin HCl (METFORMIN PO) Take 500 mg by mouth.  methylphenidate (RITALIN) 20 mg tablet Take 20 mg by mouth three (3) times daily.  magnesium oxide (MAG-OX) 400 mg tablet Take 400 mg by mouth daily.  cholecalciferol (VITAMIN D3) 1,000 unit tablet Take 1,000 Units by mouth daily.     nebivolol (BYSTOLIC) 5 mg tablet Take 5 mg by mouth daily.  levothyroxine (SYNTHROID) 112 mcg tablet Take 112 mcg by mouth Daily (before breakfast).  DULoxetine (CYMBALTA) 60 mg capsule Take 1 Cap by mouth daily.  esomeprazole (NEXIUM) 40 mg capsule Take  by mouth daily.  pioglitazone (ACTOS) 15 mg tablet Take 30 mg by mouth.  ESTROGENS, CONJUGATED (PREMARIN PO) Take  by mouth daily. Review of Systems:     Constitutional: No fevers, chills, weight loss, fatigue. Skin: No rashes, pruritis, jaundice, ulcerations, erythema. HENT: No headaches, nosebleeds, sinus pressure, rhinorrhea, sore throat. Eyes: No visual changes, blurred vision, eye pain, photophobia, jaundice. Cardiovascular: No chest pain, heart palpitations. Respiratory: No cough, SOB, wheezing, chest discomfort, orthopnea. Gastrointestinal:    Genitourinary: No dysuria, bleeding, discharge, pyuria. Musculoskeletal: No weakness, arthralgias, wasting. Endo: No sweats. Heme: No bruising, easy bleeding. Allergies: As noted. Neurological: Cranial nerves intact. Alert and oriented. Gait not assessed. Psychiatric:  No anxiety, depression, hallucinations. Visit Vitals  Ht 5' 2\" (1.575 m)   Wt 81.6 kg (180 lb)   BMI 32.92 kg/m²       Physical Assessment:     constitutional: appearance: well developed, well nourished, normal habitus, no deformities, in no acute distress. skin: inspection: no rashes, ulcers, icterus or other lesions; no clubbing or telangiectasias. palpation: no induration or subcutaneos nodules. eyes: inspection: normal conjunctivae and lids; no jaundice pupils: normal  ENMT: mouth: normal oral mucosa,lips and gums; good dentition. oropharynx: normal tongue, hard and soft palate; posterior pharynx without erithema, exudate or lesions. neck: thyroid: normal size, consistency and position; no masses or tenderness. respiratory: effort: normal chest excursion; no intercostal retraction or accessory muscle use. cardiovascular: abdominal aorta: normal size and position; no bruits. palpation: PMI of normal size and position; normal rhythm; no thrill or murmurs. abdominal: abdomen: normal consistency; no tenderness or masses. hernias: no hernias appreciated. liver: normal size and consistency. spleen: not palpable. rectal: hemoccult/guaiac: not performed. musculoskeletal: digits and nails: no clubbing, cyanosis, petechiae or other inflammatory conditions. gait: normal gait and station head and neck: normal range of motion; no pain, crepitation or contracture. spine/ribs/pelvis: normal range of motion; no pain, deformity or contracture. neurologic: cranial nerves: II-XII normal.   psychiatric: judgement/insight: within normal limits. memory: within normal limits for recent and remote events. mood and affect: no evidence of depression, anxiety or agitation. orientation: oriented to time, space and person. Basic Metabolic Profile   No results for input(s): NA, K, CL, CO2, BUN, GLU, CA, MG, PHOS in the last 72 hours. No lab exists for component: CREAT      CBC w/Diff    No results for input(s): WBC, RBC, HGB, HCT, MCV, MCH, MCHC, RDW, PLT, HGBEXT, HCTEXT, PLTEXT in the last 72 hours. No lab exists for component: MPV No results for input(s): GRANS, LYMPH, EOS, PRO, MYELO, METAS, BLAST in the last 72 hours. No lab exists for component: MONO, BASO     Hepatic Function   No results for input(s): ALB, TP, TBILI, AP, AML, LPSE in the last 72 hours. No lab exists for component: DBILI, GPT, SGOT     Coags   No results for input(s): PTP, INR, APTT, INREXT in the last 72 hours. Ashley Trevizo MD  [facility] [address]  Cell: [phone]  Direct pager: [phone]  [email or web address]  [email or web address]

## 2020-09-17 NOTE — DISCHARGE INSTRUCTIONS
DISCHARGE SUMMARY from Nurse  PATIENT INSTRUCTIONS:  After general anesthesia or intravenous sedation, for 24 hours or while taking prescription Narcotics:  · Limit your activities  · Do not drive and operate hazardous machinery  · Do not make important personal or business decisions  · Do  not drink alcoholic beverages  · If you have not urinated within 8 hours after discharge, please contact your surgeon on call. Report the following to your surgeon:  · Excessive pain, swelling, redness or odor of or around the surgical area  · Temperature over 100.5  · Nausea and vomiting lasting longer than 4 hours or if unable to take medications  · Any signs of decreased circulation or nerve impairment to extremity: change in color, persistent  numbness, tingling, coldness or increase pain  · Any questions    What to do at Home:  Recommended activity: Activity as tolerated and no driving for today. *  Please give a list of your current medications to your Primary Care Provider. *  Please update this list whenever your medications are discontinued, doses are      changed, or new medications (including over-the-counter products) are added. *  Please carry medication information at all times in case of emergency situations. These are general instructions for a healthy lifestyle:    No smoking/ No tobacco products/ Avoid exposure to second hand smoke  Surgeon General's Warning:  Quitting smoking now greatly reduces serious risk to your health. Obesity, smoking, and sedentary lifestyle greatly increases your risk for illness    A healthy diet, regular physical exercise & weight monitoring are important for maintaining a healthy lifestyle    You may be retaining fluid if you have a history of heart failure or if you experience any of the following symptoms:  Weight gain of 3 pounds or more overnight or 5 pounds in a week, increased swelling in our hands or feet or shortness of breath while lying flat in bed.   Please call your doctor as soon as you notice any of these symptoms; do not wait until your next office visit. The discharge information has been reviewed with the patient. The patient verbalized understanding. Discharge medications reviewed with the patient and appropriate educational materials and side effects teaching were provided. ___________________________________________________________________________________________________________________________________    Patient Education   Upper GI Endoscopy: What to Expect at [address] [place] [address] had an upper GI endoscopy. Your doctor used a thin, lighted tube that bends to look at the inside of your esophagus, your stomach, and the first part of the small intestine, called the duodenum. After you have an endoscopy, you will stay at the hospital or clinic for 1 to 2 hours. This will allow the medicine to wear off. You will be able to go home after your doctor or nurse checks to make sure that you're not having any problems. You may have to stay overnight if you had treatment during the test. You may have a sore throat for a day or two after the test.  This care sheet gives you a general idea about what to expect after the test.  How can you care for yourself at home? Activity   · Rest as much as you need to after you go home. · You should be able to go back to your usual activities the day after the test.  Diet   · Follow your doctor's directions for eating after the test.  · Drink plenty of fluids (unless your doctor has told you not to). Medications   · If you have a sore throat the day after the test, use an over-the-counter spray to numb your throat. Follow-up care is a key part of your treatment and safety. Be sure to make and go to all appointments, and call your doctor if you are having problems. It's also a good idea to know your test results and keep a list of the medicines you take. When should you call for help?    Call [phone] anytime you think you may need emergency care. For example, call if:    · You passed out (lost consciousness).     · You have trouble breathing.     · You pass maroon or bloody stools. Call your doctor now or seek immediate medical care if:    · You have pain that does not get better after your take pain medicine.     · You have new or worse belly pain.     · You have blood in your stools.     · You are sick to your stomach and cannot keep fluids down.     · You have a fever.     · You cannot pass stools or gas. Watch closely for changes in your health, and be sure to contact your doctor if:    · Your throat still hurts after a day or two.     · You do not get better as expected. Where can you learn more? Go to http://www.russo.com/  Enter J454 in the search box to learn more about \"Upper GI Endoscopy: What to Expect at Home. \"  Current as of: April 15, 2020               Content Version: 12.6  © 1730-6529 [employer]. Care instructions adapted under license by [employer] (which disclaims liability or warranty for this information). If you have questions about a medical condition or this instruction, always ask your healthcare professional. [place] [address] any warranty or liability for your use of this information. Patient Education   Esophageal Dilation: What to Expect at Home  Your Recovery  After you have esophageal dilation, you will stay at the hospital or surgery center for 1 to 2 hours. This will allow the medicine to wear off. You will be able to go home after your doctor or nurse checks to make sure you are not having any problems. This care sheet gives you a general idea about how long it will take for you to recover. But each person recovers at a different pace. Follow the steps below to get better as quickly as possible. How can you care for yourself at home?   Activity    · Rest as much as you need to after you go home.     · You should be able to go back to your usual activities the day after the procedure. Diet    · Follow your doctor's directions for eating after the procedure.     · Drink plenty of fluids (unless your doctor has told you not to). Medicines    · Your doctor will tell you if and when you can restart your medicines. He or she will also give you instructions about taking any new medicines.     · If you take aspirin or some other blood thinner, ask your doctor if and when to start taking it again. Make sure that you understand exactly what your doctor wants you to do.     · If you have a sore throat the day after the procedure, use an over-the-counter spray to numb your throat. Sucking on throat lozenges and gargling with warm salt water may also help relieve your symptoms. Follow-up care is a key part of your treatment and safety. Be sure to make and go to all appointments, and call your doctor if you are having problems. It's also a good idea to know your test results and keep a list of the medicines you take. When should you call for help? Call 911 anytime you think you may need emergency care. For example, call if:    · You passed out (lost consciousness).     · You have trouble breathing.     · Your stools are maroon or very bloody   Call your doctor now or seek immediate medical care if:    · You have new or worse belly pain.     · You have a fever.     · You have new or more blood in your stools.     · You are sick to your stomach and cannot drink fluids.     · You cannot pass stools or gas.     · You have pain that does not get better after you take pain medicine. Watch closely for changes in your health, and be sure to contact your doctor if:    · Your throat still hurts after a day or two.     · You do not get better as expected. Where can you learn more? Go to http://dusty-abran.info/  Enter J014 in the search box to learn more about \"Esophageal Dilation: What to Expect at Home. \"  Current as of: April 15, 2020               Content Version: 12.6  © 3638-7302 [employer]. Care instructions adapted under license by [employer] (which disclaims liability or warranty for this information). If you have questions about a medical condition or this instruction, always ask your healthcare professional. [address] any warranty or liability for your use of this information.

## 2021-04-09 ENCOUNTER — OFFICE VISIT (OUTPATIENT)
Dept: ORTHOPEDIC SURGERY | Age: 76
End: 2021-04-09
Payer: MEDICARE

## 2021-04-09 DIAGNOSIS — M17.11 OSTEOARTHRITIS OF RIGHT KNEE, UNSPECIFIED OSTEOARTHRITIS TYPE: ICD-10-CM

## 2021-04-09 DIAGNOSIS — M25.561 RIGHT KNEE PAIN, UNSPECIFIED CHRONICITY: Primary | ICD-10-CM

## 2021-04-09 PROCEDURE — 3017F COLORECTAL CA SCREEN DOC REV: CPT | Performed by: ORTHOPAEDIC SURGERY

## 2021-04-09 PROCEDURE — G8536 NO DOC ELDER MAL SCRN: HCPCS | Performed by: ORTHOPAEDIC SURGERY

## 2021-04-09 PROCEDURE — G8427 DOCREV CUR MEDS BY ELIG CLIN: HCPCS | Performed by: ORTHOPAEDIC SURGERY

## 2021-04-09 PROCEDURE — G8399 PT W/DXA RESULTS DOCUMENT: HCPCS | Performed by: ORTHOPAEDIC SURGERY

## 2021-04-09 PROCEDURE — 20611 DRAIN/INJ JOINT/BURSA W/US: CPT | Performed by: ORTHOPAEDIC SURGERY

## 2021-04-09 PROCEDURE — G8432 DEP SCR NOT DOC, RNG: HCPCS | Performed by: ORTHOPAEDIC SURGERY

## 2021-04-09 PROCEDURE — 99203 OFFICE O/P NEW LOW 30 MIN: CPT | Performed by: ORTHOPAEDIC SURGERY

## 2021-04-09 PROCEDURE — 1090F PRES/ABSN URINE INCON ASSESS: CPT | Performed by: ORTHOPAEDIC SURGERY

## 2021-04-09 PROCEDURE — G8419 CALC BMI OUT NRM PARAM NOF/U: HCPCS | Performed by: ORTHOPAEDIC SURGERY

## 2021-04-09 PROCEDURE — 1101F PT FALLS ASSESS-DOCD LE1/YR: CPT | Performed by: ORTHOPAEDIC SURGERY

## 2021-04-09 RX ADMIN — LIDOCAINE HYDROCHLORIDE 9 ML: 10 INJECTION INFILTRATION; PERINEURAL at 15:11

## 2021-04-09 RX ADMIN — TRIAMCINOLONE ACETONIDE 40 MG: 40 INJECTION, SUSPENSION INTRA-ARTICULAR; INTRAMUSCULAR at 15:11

## 2021-04-15 RX ORDER — LIDOCAINE HYDROCHLORIDE 10 MG/ML
9 INJECTION INFILTRATION; PERINEURAL ONCE
Status: COMPLETED | OUTPATIENT
Start: 2021-04-15 | End: 2021-04-09

## 2021-04-15 RX ORDER — TRIAMCINOLONE ACETONIDE 40 MG/ML
40 INJECTION, SUSPENSION INTRA-ARTICULAR; INTRAMUSCULAR ONCE
Status: COMPLETED | OUTPATIENT
Start: 2021-04-15 | End: 2021-04-09

## 2021-04-15 NOTE — PROGRESS NOTES
Name: Catalino Hamilton    : 1945     Service Dept: [address]  [facility]    Patient's Pharmacies:    [address]  [facility] [address]  Phone: [phone] Fax: [phone]       No chief complaint on file. There were no vitals taken for this visit. No Known Allergies   Current Outpatient Medications   Medication Sig Dispense Refill    carvediloL (COREG) 6.25 mg tablet Take 6.25 mg by mouth two (2) times a day.  exenatide microspheres (Bydureon) 2 mg/0.65 mL pnij 2 mg by SubCUTAneous route every seven (7) days.  atorvastatin (LIPITOR) 40 mg tablet Take  by mouth daily.  metformin HCl (METFORMIN PO) Take 500 mg by mouth.  methylphenidate (RITALIN) 20 mg tablet Take 20 mg by mouth three (3) times daily.  ESTROGENS, CONJUGATED (PREMARIN PO) Take  by mouth daily.  magnesium oxide (MAG-OX) 400 mg tablet Take 400 mg by mouth daily.  cholecalciferol (VITAMIN D3) 1,000 unit tablet Take 1,000 Units by mouth daily.  nebivolol (BYSTOLIC) 5 mg tablet Take 5 mg by mouth daily.  levothyroxine (SYNTHROID) 112 mcg tablet Take 0.1 mcg by mouth Daily (before breakfast).  DULoxetine (CYMBALTA) 60 mg capsule Take 1 Cap by mouth daily. 30 Cap 5    esomeprazole (NEXIUM) 40 mg capsule Take  by mouth daily.  pioglitazone (ACTOS) 15 mg tablet Take 30 mg by mouth.         Patient Active Problem List   Diagnosis Code    Myalgia and myositis, unspecified [id number]    Chronic low back pain M54.5, G89.29    Sacroiliitis, not elsewhere classified ([facility] [address] M46.1    Enthesopathy of hip region M76.899    Pain in limb M79.609    Encounter for long-term (current) use of high-risk medication Z79.899    Fatigue R53.83    Hypothyroidism E03.9    Chronic pain syndrome G89.4    Neck pain M54.2    Low back pain M54.5      Family History   Problem Relation Age of Onset    Hypertension Other     Diabetes Other     Cancer Other     Cancer Mother     Cancer Sister     Cancer Paternal Grandmother     Cancer Brother       Social History     Socioeconomic History    Marital status:      Spouse name: Not on file    Number of children: Not on file    Years of education: Not on file    Highest education level: Not on file   Tobacco Use    Smoking status: Never Smoker    Smokeless tobacco: Never Used   Substance and Sexual Activity    Alcohol use: No    Drug use: No      Past Surgical History:   Procedure Laterality Date    COLONOSCOPY N/A 5/9/2017    COL[facility] HX CHOLECYSTECTOMY      HX HYSTERECTOMY  1982    complete    HX KNEE ARTHROSCOPY Left 11/2016      Past Medical History:   Diagnosis Date    Adverse effect of anesthesia     woke up during hysterectomy. Placed back to sleep. 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 Caused by extreme stress    Hiatal hernia     HX OTHER MEDICAL     acute poor vision, double vision, need glasses due to [facility].  Hypertension     Hypothyroidism     Insomnia     Lyme disease 2008    Shingles 2008    Unspecified adverse effect of anesthesia     pt states she woke up during hysterectomy and put back to sleep.     Unspecified sleep apnea     not using cpap currently needs new mask        I have reviewed and agree with PFSH and ROS and intake form in chart and the record furthermore I have reviewed prior medical record(s) regarding this patients care during this appointment. Review of Systems:   Patient is a pleasant appearing individual, appropriately dressed, well hydrated, well nourished, who is alert, appropriately oriented for age, and in no acute distress with a normal gait and normal affect who does not appear to be in any significant pain. Physical Exam:  Right Knee -Decrease range of motion with flexion, Knee arc of greater than 50 degrees, Some crepitation, Grossly neurovascularly intact, Good cap refill, No skin lesion, Moderate swelling, No gross instability, Some quadriceps weakness, Kellgren and Elijah at least grade 3    Left Knee - Full Range of Motion, No crepitation, Grossly neurovascularly intact, Good cap refill, No skin lesion, No swelling, No gross instability, No quadriceps weakness    Procedure Documentation:    I discussed in detail the risks, benefits and complications of an injection which included but are not limited to infection, skin reactions, hot swollen joint, and anaphylaxis with the patient. The patient verbalized understanding and gave informed consent for the injection. The patient's knee was flexed to 90° and the skin prepped using sterile alcohol solution. A sterile needle was inserted into the right knee and the mixture of 9 mL Lidocaine 1%, 1 mL Kenalog 40 mg was injected under sterile technique. The needle was withdrawn and the puncture site sealed with a Band-Aid. Technique: Under sterile conditions a [employer] ultrasound unit with a variable frequency (7.0-14.0 MHz) linear transducer was used to localize the placement of needle into the right knee joint. Findings: Successful needle placement for knee injection. Final images were taken and saved for permanent record. The patient tolerated the injection well. The patient was instructed to call the office immediately if there is any pain, redness, warmth, fever, or chills. Encounter Diagnoses     ICD-10-CM ICD-9-CM   1.  Right knee pain, unspecified chronicity  M25.561 719.46   2. Osteoarthritis of right knee, unspecified osteoarthritis type  M17.11 715.96       HPI:  The patient is here with a chief complaint of right knee pain, been having it for a while now, progressively getting worse. Nothing has helped. Continues to have difficulty. X-rays are positive for moderate OA. Assessment/Plan:  Plan would be for right knee cortisone injection. We will see her back in a week to see how she is doing. If she is not better, we will consider other treatment options. As part of continued conservative pain management options the patient was advised to utilize Tylenol or OTC NSAIDS as long as it is not medically contraindicated. Return to Office: Follow-up and Dispositions    · Return if symptoms worsen or fail to improve. Administrations This Visit     lidocaine (XYLOCAINE) 10 mg/mL (1 %) injection 9 mL     Admin Date  04/09/2021 Action  Given Dose  9 mL Route  Other Administered By  Wing Florin LPN          triamcinolone acetonide (KENALOG-40) 40 mg/mL injection 40 mg     Admin Date  04/09/2021 Action  Given Dose  40 mg Route  Intra artICUlar Administered By  Wing Florin LPN               Scribed by Nilton Griffiths LPN as dictated by [facility] ROLAND Rebolledo MD.  Documentation True and Accepted Balwinder Rebolledo MD

## 2021-04-15 NOTE — PATIENT INSTRUCTIONS
Knee Pain or Injury: Care Instructions  Your Care Instructions     Injuries are a common cause of knee problems. Sudden (acute) injuries may be caused by a direct blow to the knee. They can also be caused by abnormal twisting, bending, or falling on the knee. Pain, bruising, or swelling may be severe, and may start within minutes of the injury. Overuse is another cause of knee pain. Other causes are climbing stairs, kneeling, and other activities that use the knee. Everyday wear and tear, especially as you get older, also can cause knee pain. Rest, along with home treatment, often relieves pain and allows your knee to heal. If you have a serious knee injury, you may need tests and treatment. Follow-up care is a key part of your treatment and safety. Be sure to make and go to all appointments, and call your doctor if you are having problems. It's also a good idea to know your test results and keep a list of the medicines you take. How can you care for yourself at home? · Be safe with medicines. Read and follow all instructions on the label. ? If the doctor gave you a prescription medicine for pain, take it as prescribed. ? If you are not taking a prescription pain medicine, ask your doctor if you can take an over-the-counter medicine. · Rest and protect your knee. Take a break from any activity that may cause pain. · Put ice or a cold pack on your knee for 10 to 20 minutes at a time. Put a thin cloth between the ice and your skin. · Prop up a sore knee on a pillow when you ice it or anytime you sit or lie down for the next 3 days. Try to keep it above the level of your heart. This will help reduce swelling. · If your knee is not swollen, you can put moist heat, a heating pad, or a warm cloth on your knee. · If your doctor recommends an elastic bandage, sleeve, or other type of support for your knee, wear it as directed.   · Follow your doctor's instructions about how much weight you can put on your leg. Use a cane, crutches, or a walker as instructed. · Follow your doctor's instructions about activity during your healing process. If you can do mild exercise, slowly increase your activity. · Reach and stay at a healthy weight. Extra weight can strain the joints, especially the knees and hips, and make the pain worse. Losing even a few pounds may help. When should you call for help? Call 911 anytime you think you may need emergency care. For example, call if:    · You have symptoms of a blood clot in your lung (called a pulmonary embolism). These may include:  ? Sudden chest pain. ? Trouble breathing. ? Coughing up blood. Call your doctor now or seek immediate medical care if:    · You have severe or increasing pain.     · Your leg or foot turns cold or changes color.     · You cannot stand or put weight on your knee.     · Your knee looks twisted or bent out of shape.     · You cannot move your knee.     · You have signs of infection, such as:  ? Increased pain, swelling, warmth, or redness. ? Red streaks leading from the knee. ? Pus draining from a place on your knee. ? A fever.     · You have signs of a blood clot in your leg (called a deep vein thrombosis), such as:  ? Pain in your calf, back of the knee, thigh, or groin. ? Redness and swelling in your leg or groin. Watch closely for changes in your health, and be sure to contact your doctor if:    · You have tingling, weakness, or numbness in your knee.     · You have any new symptoms, such as swelling.     · You have bruises from a knee injury that last longer than 2 weeks.     · You do not get better as expected. Where can you learn more? Go to http://www.gray.com/  Enter K195 in the search box to learn more about \"Knee Pain or Injury: Care Instructions. \"  Current as of: February 26, 2020               Content Version: 12.8  © 7708-8164 [employer].    Care instructions adapted under license by [employer] (which disclaims liability or warranty for this information). If you have questions about a medical condition or this instruction, always ask your healthcare professional. [address] any warranty or liability for your use of this information.

## 2021-04-16 RX ORDER — RIVAROXABAN 15 MG/1
TABLET, FILM COATED ORAL
COMMUNITY
Start: 2021-01-22 | End: 2022-09-20

## 2021-04-16 RX ORDER — CARVEDILOL 3.12 MG/1
TABLET ORAL
COMMUNITY
Start: 2021-01-24

## 2021-06-22 DIAGNOSIS — M25.511 RIGHT SHOULDER PAIN, UNSPECIFIED CHRONICITY: Primary | ICD-10-CM

## 2021-06-25 ENCOUNTER — OFFICE VISIT (OUTPATIENT)
Dept: ORTHOPEDIC SURGERY | Age: 76
End: 2021-06-25
Payer: MEDICARE

## 2021-06-25 DIAGNOSIS — M25.561 RIGHT KNEE PAIN, UNSPECIFIED CHRONICITY: Primary | ICD-10-CM

## 2021-06-25 DIAGNOSIS — M17.11 PRIMARY OSTEOARTHRITIS OF RIGHT KNEE: ICD-10-CM

## 2021-06-25 PROCEDURE — G8427 DOCREV CUR MEDS BY ELIG CLIN: HCPCS | Performed by: ORTHOPAEDIC SURGERY

## 2021-06-25 PROCEDURE — 99214 OFFICE O/P EST MOD 30 MIN: CPT | Performed by: ORTHOPAEDIC SURGERY

## 2021-06-25 PROCEDURE — 3017F COLORECTAL CA SCREEN DOC REV: CPT | Performed by: ORTHOPAEDIC SURGERY

## 2021-06-25 PROCEDURE — 1101F PT FALLS ASSESS-DOCD LE1/YR: CPT | Performed by: ORTHOPAEDIC SURGERY

## 2021-06-25 PROCEDURE — 20611 DRAIN/INJ JOINT/BURSA W/US: CPT | Performed by: ORTHOPAEDIC SURGERY

## 2021-06-25 PROCEDURE — G8432 DEP SCR NOT DOC, RNG: HCPCS | Performed by: ORTHOPAEDIC SURGERY

## 2021-06-25 PROCEDURE — G8536 NO DOC ELDER MAL SCRN: HCPCS | Performed by: ORTHOPAEDIC SURGERY

## 2021-06-25 PROCEDURE — G8399 PT W/DXA RESULTS DOCUMENT: HCPCS | Performed by: ORTHOPAEDIC SURGERY

## 2021-06-25 PROCEDURE — 1090F PRES/ABSN URINE INCON ASSESS: CPT | Performed by: ORTHOPAEDIC SURGERY

## 2021-06-25 PROCEDURE — G8417 CALC BMI ABV UP PARAM F/U: HCPCS | Performed by: ORTHOPAEDIC SURGERY

## 2021-06-25 RX ORDER — ESCITALOPRAM OXALATE 5 MG/1
TABLET ORAL
COMMUNITY
Start: 2021-04-19 | End: 2022-09-20

## 2021-06-25 RX ORDER — ESTRADIOL 2 MG/1
RING VAGINAL
COMMUNITY
Start: 2021-04-27

## 2021-06-25 RX ORDER — APIXABAN 5 MG/1
TABLET, FILM COATED ORAL
COMMUNITY
Start: 2021-05-20 | End: 2022-08-11

## 2021-06-25 RX ORDER — LIDOCAINE HYDROCHLORIDE 10 MG/ML
9 INJECTION INFILTRATION; PERINEURAL ONCE
Status: COMPLETED | OUTPATIENT
Start: 2021-06-25 | End: 2021-06-25

## 2021-06-25 RX ORDER — EXENATIDE 2 MG/.85ML
INJECTION, SUSPENSION, EXTENDED RELEASE SUBCUTANEOUS
COMMUNITY
Start: 2021-05-18

## 2021-06-25 RX ORDER — DULOXETIN HYDROCHLORIDE 30 MG/1
CAPSULE, DELAYED RELEASE ORAL
COMMUNITY
Start: 2021-05-24 | End: 2022-09-20

## 2021-06-25 RX ORDER — LEVOTHYROXINE SODIUM 100 UG/1
TABLET ORAL
COMMUNITY
Start: 2021-05-30

## 2021-06-25 RX ORDER — ESCITALOPRAM OXALATE 10 MG/1
TABLET ORAL
COMMUNITY
Start: 2021-05-30 | End: 2022-09-20

## 2021-06-25 RX ORDER — TRIAMCINOLONE ACETONIDE 40 MG/ML
40 INJECTION, SUSPENSION INTRA-ARTICULAR; INTRAMUSCULAR ONCE
Status: COMPLETED | OUTPATIENT
Start: 2021-06-25 | End: 2021-06-25

## 2021-06-25 RX ADMIN — TRIAMCINOLONE ACETONIDE 40 MG: 40 INJECTION, SUSPENSION INTRA-ARTICULAR; INTRAMUSCULAR at 11:12

## 2021-06-25 RX ADMIN — LIDOCAINE HYDROCHLORIDE 9 ML: 10 INJECTION INFILTRATION; PERINEURAL at 11:12

## 2021-06-25 NOTE — LETTER
Catrina Massimo   1945   [id number]       6/25/2021       I hereby authorize and direct Balwinder Luna MD, Fide Lantigua, and whomever he may designate as his associate to perform upon myself the following procedure:    Injection of: Kenalog, Supartz, Euflexxa, Orthovisc in the Right/Left ____________________. If any unforeseen condition arises in the course of the procedure, I further authorize him and his associated and/or assistant(s) to do whatever he/she deems advisable. The nature, purpose, benefits, risks, side effects, likelihood of achieving goals, and potential problems that might occur during recuperation, risks for not receiving the proposed care, treatment and services and alternatives of the procedure have been fully explained to me by my physician including, but not limited to:    Swelling, joint pain, skin pigment changes, worsening of condition, and failure to improve. I acknowledge that no guarantee or assurance has been made to me as to the results that may be obtained or the likelihood of success.                 _______________________________________     Signature of patient or authorized representative                [employer] [facility] fax: [phone]

## 2021-06-25 NOTE — PROGRESS NOTES
Name: Yoselyn Pham    : 1945     Service Dept: [address] and Sports Medicine    Patient's Pharmacies:    [address] [place] [address] [place] [address] Road  [address] [address]  Phone: [phone] Fax: [phone]       Chief Complaint   Patient presents with    Knee Pain    Shoulder Pain    Arm Pain        There were no vitals taken for this visit. No Known Allergies   Current Outpatient Medications   Medication Sig Dispense Refill    Eliquis 5 mg tablet       escitalopram oxalate (LEXAPRO) 10 mg tablet       escitalopram oxalate (LEXAPRO) 5 mg tablet       Estring 2 mg (7.5 mcg /24 hour) vaginal ring       DULoxetine (CYMBALTA) 30 mg capsule       Bydureon BCise 2 mg/0.85 mL atIn       levothyroxine (SYNTHROID) 100 mcg tablet       Xarelto 15 mg tab tablet       carvediloL (COREG) 3.125 mg tablet       exenatide microspheres (Bydureon) 2 mg/0.65 mL pnij 2 mg by SubCUTAneous route every seven (7) days.  atorvastatin (LIPITOR) 40 mg tablet Take  by mouth daily.  metformin HCl (METFORMIN PO) Take 500 mg by mouth.  methylphenidate (RITALIN) 20 mg tablet Take 20 mg by mouth three (3) times daily.  ESTROGENS, CONJUGATED (PREMARIN PO) Take  by mouth daily.  magnesium oxide (MAG-OX) 400 mg tablet Take 400 mg by mouth daily.  cholecalciferol (VITAMIN D3) 1,000 unit tablet Take 1,000 Units by mouth daily.  nebivolol (BYSTOLIC) 5 mg tablet Take 5 mg by mouth daily.  levothyroxine (SYNTHROID) 112 mcg tablet Take 0.1 mcg by mouth Daily (before breakfast).  DULoxetine (CYMBALTA) 60 mg capsule Take 1 Cap by mouth daily. 30 Cap 5    esomeprazole (NEXIUM) 40 mg capsule Take  by mouth daily.  pioglitazone (ACTOS) 15 mg tablet Take 30 mg by mouth.        Current Facility-Administered Medications   Medication Dose Route Frequency Provider Last Rate Last Admin    lidocaine (XYLOCAINE) 10 mg/mL (1 %) injection 9 mL  9 mL Other ONCE Balwinder Landrum MD        triamcinolone acetonide (KENALOG-40) 40 mg/mL injection 40 mg  40 mg Intra artICUlar ONCE Nano Mathur MD          Patient Active Problem List   Diagnosis Code    Myalgia and myositis, unspecified [id number]    Chronic low back pain M54.5, G89.29    Sacroiliitis, not elsewhere classified (HCC) M46.1    Enthesopathy of hip region M76.899    Pain in limb M79.609    Encounter for long-term (current) use of high-risk medication Z79.899    Fatigue R53.83    Hypothyroidism E03.9    Chronic pain syndrome G89.4    Neck pain M54.2    Low back pain M54.5      Family History   Problem Relation Age of Onset    Hypertension Other     Diabetes Other     Cancer Other     Cancer Mother     Cancer Sister     Cancer Paternal Grandmother     Cancer Brother       Social History     Socioeconomic History    Marital status:      Spouse name: Not on file    Number of children: Not on file    Years of education: Not on file    Highest education level: Not on file   Tobacco Use    Smoking status: Never Smoker    Smokeless tobacco: Never Used   Substance and Sexual Activity    Alcohol use: No    Drug use: No     Social Determinants of Health     Financial Resource Strain:     Difficulty of Paying Living Expenses:    Food Insecurity:     Worried About Running Out of Food in the Last Year:     Ran Out of Food in the Last Year:    Transportation Needs:     Lack of Transportation (Medical):      Lack of Transportation (Non-Medical):    Physical Activity:     Days of Exercise per Week:     Minutes of Exercise per Session:    Stress:     Feeling of Stress :    Social Connections:     Frequency of Communication with Friends and Family:     Frequency of Social Gatherings with Friends and Family:     Attends Yarsani Services:     Active Member of Clubs or Organizations:     Attends Club or Organization Meetings:     Marital Status:       Past Surgical History:   Procedure Laterality Date    COLONOSCOPY N/A 5/9/2017    COL[facility] performed [facility] Marr [facility] at [facility]    HX CHOLECYSTECTOMY      HX HYSTERECTOMY  1982    complete    HX KNEE ARTHROSCOPY Left 11/2016      Past Medical History:   Diagnosis Date    Adverse effect of anesthesia     woke up during hysterectomy. Placed back to sleep. 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 Caused by extreme stress    Hiatal hernia     HX OTHER MEDICAL     acute poor vision, double vision, need glasses due to [facility].  Hypertension     Hypothyroidism     Insomnia     Lyme disease 2008    Shingles 2008    Unspecified adverse effect of anesthesia     pt states she woke up during hysterectomy and put back to sleep.  Unspecified sleep apnea     not using cpap currently needs new mask        I have reviewed and agree with PFSH and ROS and intake form in chart and the record furthermore I have reviewed prior medical record(s) regarding this patients care during this appointment. Review of Systems:   Patient is a pleasant appearing individual, appropriately dressed, well hydrated, well nourished, who is alert, appropriately oriented for age, and in no acute distress with a normal gait and normal affect who does not appear to be in any significant pain.      Physical Exam:  Right Knee -Decrease range of motion with flexion, Knee arc of greater than 50 degrees, Some crepitation, Grossly neurovascularly intact, Good cap refill, No skin lesion, Moderate swelling, No gross instability, Some quadriceps weakness, Kellgren and Elijah at least grade 3    Left Knee - Full Range of Motion, No crepitation, Grossly neurovascularly intact, Good cap refill, No skin lesion, No swelling, No gross instability, No quadriceps weakness    Procedure Documentation:    I discussed in detail the risks, benefits and complications of an injection which included but are not limited to infection, skin reactions, hot swollen joint, and anaphylaxis with the patient. The patient verbalized understanding and gave informed consent for the injection. The patient's knee was flexed to 90° and the skin prepped using sterile alcohol solution. A sterile needle was inserted into the right knee and the mixture of 9 mL Lidocaine 1%, 1 mL Kenalog 40 mg was injected under sterile technique. The needle was withdrawn and the puncture site sealed with a Band-Aid. Technique: Under sterile conditions a [employer] ultrasound unit with a variable frequency (7.0-14.0 MHz) linear transducer was used to localize the placement of needle into the right knee joint. Findings: Successful needle placement for knee injection. Final images were taken and saved for permanent record. The patient tolerated the injection well. The patient was instructed to call the office immediately if there is any pain, redness, warmth, fever, or chills. Encounter Diagnoses     ICD-10-CM ICD-9-CM   1. Right knee pain, unspecified chronicity  M25.561 719.46   2. Primary osteoarthritis of right knee  M17.11 715.16       HPI:  The patient is here with a chief complaint of right knee pain and right shoulder pain, dull throbbing pain. She had a fracture of the right proximal humerus in December, so we are going to get x-rays for that. Pain is 7/10. X-rays of the right knee are positive for OA. Assessment/Plan:  Plan would be for right knee cortisone injection today.   See the patient back in 2 to 3 weeks and review x-rays of the right shoulder from an old fracture and go from there. As part of continued conservative pain management options the patient was advised to utilize Tylenol or OTC NSAIDS as long as it is not medically contraindicated. Return to Office: Follow-up and Dispositions    · Return in about 3 weeks (around 7/16/2021). Scribed by Fer English as dictated by Leopoldo Dodson. Kp Juan MD.  Documentation True and Accepted Balwinder Goodrich Asp, MD

## 2021-06-25 NOTE — PATIENT INSTRUCTIONS
Knee Arthritis: Care Instructions  Your Care Instructions     Knee arthritis is a breakdown of the cartilage that cushions your knee joint. When the cartilage wears down, your bones rub against each other. This causes pain and stiffness. Knee arthritis tends to get worse with time. Treatment for knee arthritis involves reducing pain, making the leg muscles stronger, and staying at a healthy body weight. The treatment usually does not improve the health of the cartilage, but it can reduce pain and improve how well your knee works. You can take simple measures to protect your knee joints, ease your pain, and help you stay active. Follow-up care is a key part of your treatment and safety. Be sure to make and go to all appointments, and call your doctor if you are having problems. It's also a good idea to know your test results and keep a list of the medicines you take. How can you care for yourself at home? · Know that knee arthritis will cause more pain on some days than on others. · Stay at a healthy weight. Lose weight if you are overweight. When you stand up, the pressure on your knees from every pound of body weight is multiplied four times. So if you lose 10 pounds, you will reduce the pressure on your knees by 40 pounds. · Talk to your doctor or physical therapist about exercises that will help ease joint pain. ? Stretch to help prevent stiffness and to prevent injury before you exercise. You may enjoy gentle forms of yoga to help keep your knee joints and muscles flexible. ? Walk instead of jog.  ? Ride a bike. This makes your thigh muscles stronger and takes pressure off your knee. ? Wear well-fitting and comfortable shoes. ? Exercise in chest-deep water. This can help you exercise longer with less pain. ? Avoid exercises that include squatting or kneeling. They can put a lot of strain on your knees.   ? Talk to your doctor to make sure that the exercise you do is not making the arthritis worse.  · Do not sit for long periods of time. Try to walk once in a while to keep your knee from getting stiff. · Ask your doctor or physical therapist whether shoe inserts may reduce your arthritis pain. · If you can afford it, get new athletic shoes at least every year. This can help reduce the strain on your knees. · Use a device to help you do everyday activities. ? A cane or walking stick can help you keep your balance when you walk. Hold the cane or walking stick in the hand opposite the painful knee. ? If you feel like you may fall when you walk, try using crutches or a front-wheeled walker. These can prevent falls that could cause more damage to your knee. ? A knee brace may help keep your knee stable and prevent pain. ? You also can use other things to make life easier, such as a higher toilet seat and handrails in the bathtub or shower. · Take pain medicines exactly as directed. ? Do not wait until you are in severe pain. You will get better results if you take it sooner. ? If you are not taking a prescription pain medicine, take an over-the-counter medicine such as acetaminophen (Tylenol), ibuprofen (Advil, Motrin), or naproxen (Aleve). Read and follow all instructions on the label. ? Do not take two or more pain medicines at the same time unless the doctor told you to. Many pain medicines have acetaminophen, which is Tylenol. Too much acetaminophen (Tylenol) can be harmful. ? Tell your doctor if you take a blood thinner, have diabetes, or have allergies to shellfish. · Ask your doctor if you might benefit from a shot of steroid medicine into your knee. This may provide pain relief for several months. · Many people take the supplements glucosamine and chondroitin for osteoarthritis. Some people feel they help, but the medical research does not show that they work. Talk to your doctor before you take these supplements. When should you call for help?    Call your doctor now or seek immediate medical care if:    · You have sudden swelling, warmth, or pain in your knee.     · You have knee pain and a fever or rash.     · You have such bad pain that you cannot use your knee. Watch closely for changes in your health, and be sure to contact your doctor if you have any problems. Where can you learn more? Go to http://www.gray.com/  Enter W187 in the search box to learn more about \"Knee Arthritis: Care Instructions. \"  Current as of: August 5, 2020               Content Version: 12.8  © 2006-2021 [employer]. Care instructions adapted under license by [employer] (which disclaims liability or warranty for this information). If you have questions about a medical condition or this instruction, always ask your healthcare professional. [address] any warranty or liability for your use of this information.

## 2021-07-09 ENCOUNTER — OFFICE VISIT (OUTPATIENT)
Dept: ORTHOPEDIC SURGERY | Age: 76
End: 2021-07-09
Payer: MEDICARE

## 2021-07-09 DIAGNOSIS — M75.51 BURSITIS OF RIGHT SHOULDER: ICD-10-CM

## 2021-07-09 DIAGNOSIS — M25.511 RIGHT SHOULDER PAIN, UNSPECIFIED CHRONICITY: Primary | ICD-10-CM

## 2021-07-09 PROCEDURE — 1090F PRES/ABSN URINE INCON ASSESS: CPT | Performed by: ORTHOPAEDIC SURGERY

## 2021-07-09 PROCEDURE — G8510 SCR DEP NEG, NO PLAN REQD: HCPCS | Performed by: ORTHOPAEDIC SURGERY

## 2021-07-09 PROCEDURE — G8399 PT W/DXA RESULTS DOCUMENT: HCPCS | Performed by: ORTHOPAEDIC SURGERY

## 2021-07-09 PROCEDURE — 20611 DRAIN/INJ JOINT/BURSA W/US: CPT | Performed by: ORTHOPAEDIC SURGERY

## 2021-07-09 PROCEDURE — G8427 DOCREV CUR MEDS BY ELIG CLIN: HCPCS | Performed by: ORTHOPAEDIC SURGERY

## 2021-07-09 PROCEDURE — 1101F PT FALLS ASSESS-DOCD LE1/YR: CPT | Performed by: ORTHOPAEDIC SURGERY

## 2021-07-09 PROCEDURE — 99214 OFFICE O/P EST MOD 30 MIN: CPT | Performed by: ORTHOPAEDIC SURGERY

## 2021-07-09 PROCEDURE — G8536 NO DOC ELDER MAL SCRN: HCPCS | Performed by: ORTHOPAEDIC SURGERY

## 2021-07-09 PROCEDURE — 3017F COLORECTAL CA SCREEN DOC REV: CPT | Performed by: ORTHOPAEDIC SURGERY

## 2021-07-09 PROCEDURE — G8417 CALC BMI ABV UP PARAM F/U: HCPCS | Performed by: ORTHOPAEDIC SURGERY

## 2021-07-09 RX ORDER — LIDOCAINE HYDROCHLORIDE 10 MG/ML
9 INJECTION INFILTRATION; PERINEURAL ONCE
Status: COMPLETED | OUTPATIENT
Start: 2021-07-09 | End: 2021-07-09

## 2021-07-09 RX ORDER — TRIAMCINOLONE ACETONIDE 40 MG/ML
40 INJECTION, SUSPENSION INTRA-ARTICULAR; INTRAMUSCULAR ONCE
Status: COMPLETED | OUTPATIENT
Start: 2021-07-09 | End: 2021-07-09

## 2021-07-09 RX ADMIN — LIDOCAINE HYDROCHLORIDE 9 ML: 10 INJECTION INFILTRATION; PERINEURAL at 10:27

## 2021-07-09 RX ADMIN — TRIAMCINOLONE ACETONIDE 40 MG: 40 INJECTION, SUSPENSION INTRA-ARTICULAR; INTRAMUSCULAR at 10:28

## 2021-07-09 NOTE — LETTER
Beronica Rodriguez   1945   [id number]       7/9/2021       I hereby authorize and direct Balwinder Harris MD, Randy García, and whomever he may designate as his associate to perform upon myself the following procedure:    Injection of: Kenalog, Supartz, Euflexxa, Orthovisc in the Right/Left ____________________. If any unforeseen condition arises in the course of the procedure, I further authorize him and his associated and/or assistant(s) to do whatever he/she deems advisable. The nature, purpose, benefits, risks, side effects, likelihood of achieving goals, and potential problems that might occur during recuperation, risks for not receiving the proposed care, treatment and services and alternatives of the procedure have been fully explained to me by my physician including, but not limited to:    Swelling, joint pain, skin pigment changes, worsening of condition, and failure to improve. I acknowledge that no guarantee or assurance has been made to me as to the results that may be obtained or the likelihood of success.                 _______________________________________     Signature of patient or authorized representative                [employer] and [facility] Medicine fax: [phone]

## 2021-07-09 NOTE — LETTER
Vernell Slade   1945   [id number]       7/9/2021       I hereby authorize and direct Balwinder Hendricks MD, Maria Victoria Spears, and whomever he may designate as his associate to perform upon myself the following procedure:    Injection of: Kenalog, Supartz, Euflexxa, Orthovisc in the Right/Left ____________________. If any unforeseen condition arises in the course of the procedure, I further authorize him and his associated and/or assistant(s) to do whatever he/she deems advisable. The nature, purpose, benefits, risks, side effects, likelihood of achieving goals, and potential problems that might occur during recuperation, risks for not receiving the proposed care, treatment and services and alternatives of the procedure have been fully explained to me by my physician including, but not limited to:    Swelling, joint pain, skin pigment changes, worsening of condition, and failure to improve. I acknowledge that no guarantee or assurance has been made to me as to the results that may be obtained or the likelihood of success.                 _______________________________________     Signature of patient or authorized representative                [employer] and [facility] fax: [phone]

## 2021-07-09 NOTE — PROGRESS NOTES
Name: Barbie Johnson    : 1945     Service Dept: [address]  and Sports Medicine    Patient's Pharmacies:    [address]  [address] [place] [address]  Phone: [phone] Fax: [phone]       Chief Complaint   Patient presents with    Shoulder Pain        There were no vitals taken for this visit. No Known Allergies   Current Outpatient Medications   Medication Sig Dispense Refill    Eliquis 5 mg tablet       escitalopram oxalate (LEXAPRO) 10 mg tablet       escitalopram oxalate (LEXAPRO) 5 mg tablet       Estring 2 mg (7.5 mcg /24 hour) vaginal ring       DULoxetine (CYMBALTA) 30 mg capsule       Bydureon BCise 2 mg/0.85 mL atIn       levothyroxine (SYNTHROID) 100 mcg tablet       Xarelto 15 mg tab tablet       carvediloL (COREG) 3.125 mg tablet       exenatide microspheres (Bydureon) 2 mg/0.65 mL pnij 2 mg by SubCUTAneous route every seven (7) days.  atorvastatin (LIPITOR) 40 mg tablet Take  by mouth daily.  metformin HCl (METFORMIN PO) Take 500 mg by mouth.  methylphenidate (RITALIN) 20 mg tablet Take 20 mg by mouth three (3) times daily.  ESTROGENS, CONJUGATED (PREMARIN PO) Take  by mouth daily.  magnesium oxide (MAG-OX) 400 mg tablet Take 400 mg by mouth daily.  cholecalciferol (VITAMIN D3) 1,000 unit tablet Take 1,000 Units by mouth daily.  nebivolol (BYSTOLIC) 5 mg tablet Take 5 mg by mouth daily.  levothyroxine (SYNTHROID) 112 mcg tablet Take 0.1 mcg by mouth Daily (before breakfast).  DULoxetine (CYMBALTA) 60 mg capsule Take 1 Cap by mouth daily. 30 Cap 5    esomeprazole (NEXIUM) 40 mg capsule Take  by mouth daily.  pioglitazone (ACTOS) 15 mg tablet Take 30 mg by mouth.         Patient Active Problem List   Diagnosis Code    Myalgia and myositis, unspecified [id number]    Chronic low back pain M54.5, G89.29    [facility] not elsewhere classified [facility] M46.1    Enthesopathy of hip region M76.899    Pain in limb M79.609    Encounter for long-term (current) use of high-risk medication Z79.899    Fatigue R53.83    Hypothyroidism E03.9    Chronic pain syndrome G89.4    Neck pain M54.2    Low back pain M54.5      Family History   Problem Relation Age of Onset    Hypertension Other     Diabetes Other     Cancer Other     Cancer Mother     Cancer Sister     Cancer Paternal Grandmother     Cancer Brother       Social History     Socioeconomic History    Marital status:      Spouse name: Not on file    Number of children: Not on file    Years of education: Not on file    Highest education level: Not on file   Tobacco Use    Smoking status: Never Smoker    Smokeless tobacco: Never Used   Substance and Sexual Activity    Alcohol use: No    Drug use: No     Social Determinants of Health     Financial Resource Strain:     Difficulty of Paying Living Expenses:    Food Insecurity:     Worried About Running Out of Food in the Last Year:     [address] in the Last Year:    Transportation Needs:     Lack of Transportation (Medical):  Lack of Transportation (Non-Medical):    Physical Activity:     Days of Exercise per Week:     Minutes of Exercise per Session:    Stress:     Feeling of Stress :    Social Connections:     Frequency of Communication with Friends and Family:     Frequency of Social Gatherings with Friends and Family:     Attends Sabianist Services:     Active Member of Clubs or Organizations:     Attends Club or Organization Meetings:     Marital Status:       Past Surgical History:   Procedure Laterality Date    COLONOSCOPY N/A 5/9/2017    COLON[facility] performed by Laci Shine MD at [facility] ENDOSCOPY    HX CHOLECYSTECTOMY      [address] [facility]    complete    HX KNEE ARTHROSCOPY Left 11/2016      Past Medical History:   Diagnosis Date    Adverse effect of anesthesia     woke up during hysterectomy. Placed back to sleep.  Chronic fatigue syndrome 2008    Onset by [place]    Chronic pain     lower back    Depression     1  time per patient no meds    Diabetes ([facility] [address]     Type 2    Fibromyalgia 2008    onset by [place]    Fracture 9/2012    right wrist    Fracture of right wrist 2011    no surgery    GERD (gastroesophageal reflux disease)     Guillain-[place] syndrome ([facility] [address] 2008    could not see or walk, lost weight, could not write, no respiratory [place]). Caused by extreme stress    Hiatal hernia     HX OTHER MEDICAL     acute poor vision, double vision, need glasses due to [facility].  Hypertension     Hypothyroidism     Insomnia     Lyme disease 2008    Shingles 2008    Unspecified adverse effect of anesthesia     pt states she woke up during hysterectomy and put back to sleep.  Unspecified sleep apnea     not using cpap currently needs new mask        I have reviewed and agree with PFSH and ROS and intake form in chart and the record furthermore I have reviewed prior medical record(s) regarding this patients care during this appointment. Review of Systems:   Patient is a pleasant appearing individual, appropriately dressed, well hydrated, well nourished, who is alert, appropriately oriented for age, and in no acute distress with a normal gait and normal affect who does not appear to be in any significant pain. Physical Exam:  Right Shoulder - Grossly neurovascularly intact. Range of motion-Full passive, Active with impingement. No Point tenderness, Strength-weakness with abduction, some mild crepitation, No skin lesion are identified, No instabilty is noted, No apprehension. No Swelling. Left Shoulder - Grossly neurovascularly intact, Full Range of motion, No point tenderness, No weakness, No skin lesions, No Instability, No apprehension, No swelling.     Procedure Documentation:    I discussed in detail the risks, benefits and complications of an injection which included but are not limited to infection, skin reactions, hot swollen joint, and anaphylaxis with the patient. The patient verbalized understanding and gave informed consent for the injection. The patient's right shoulder were prepped using sterile alcohol solution. A sterile needle was inserted into the right shoulder and the mixture of 9 mL Lidocaine 1%, 1 mL Kenalog 40 mg was injected under sterile technique. The needle was withdrawn and the puncture site sealed with a Band-Aid. Technique: Under sterile conditions a [employer] ultrasound unit with a variable frequency (7.0-14.0 MHz) linear transducer was used to localize the placement of needle into the right joint. Findings: Successful needle placement for shoulder injection. Final images were taken and saved for permanent record. The patient tolerated the injection well. The patient was instructed to call the office immediately if there is any pain, redness, warmth, fever, or chills. Encounter Diagnoses     ICD-10-CM ICD-9-CM   1. Right shoulder pain, unspecified chronicity  M25.511 719.41   2. Bursitis of right shoulder  M75.51 726.10       HPI:  The patient is here with a chief complaint of right shoulder pain, sharp, throbbing pain, progressively getting better, but not completely resolved. X-rays are positive for severe OA of the right shoulder. Assessment/Plan:  Plan will be for cortisone injection right shoulder. If it helps, that is all we need to do. If she is not better, we will consider reverse shoulder. We will see her back in four weeks and go from there. As part of continued conservative pain management options the patient was advised to utilize Tylenol or OTC NSAIDS as long as it is not medically contraindicated. Return to Office: Follow-up and Dispositions    · Return in about 4 weeks (around 8/6/2021).         Administrations This Visit     lidocaine (XYLOCAINE) 10 mg/mL (1 %) injection 9 mL Admin Date  07/09/2021 Action  Given Dose  9 mL Route  Other Administered By  Mari Mtz LPN          triamcinolone acetonide (KENALOG-40) 40 mg/mL injection 40 mg     Admin Date  07/09/2021 Action  Given Dose  40 mg Route  Intra artICUlar Administered By  Mari Mtz LPN               Scribed by Aparna Cortez LPN as dictated by [facility] ROLAND Sanchez MD.  Documentation True and Accepted Balwinder Sanchez MD

## 2021-07-09 NOTE — PATIENT INSTRUCTIONS
Shoulder Pain: Care Instructions  Your Care Instructions     You can hurt your shoulder by using it too much during an activity, such as fishing or baseball. It can also happen as part of the everyday wear and tear of getting older. Shoulder injuries can be slow to heal, but your shoulder should get better with time. Your doctor may recommend a sling to rest your shoulder. If you have injured your shoulder, you may need testing and treatment. Follow-up care is a key part of your treatment and safety. Be sure to make and go to all appointments, and call your doctor if you are having problems. It's also a good idea to know your test results and keep a list of the medicines you take. How can you care for yourself at home? · Take pain medicines exactly as directed. ? If the doctor gave you a prescription medicine for pain, take it as prescribed. ? If you are not taking a prescription pain medicine, ask your doctor if you can take an over-the-counter medicine. ? Do not take two or more pain medicines at the same time unless the doctor told you to. Many pain medicines contain acetaminophen, which is Tylenol. Too much acetaminophen (Tylenol) can be harmful. · If your doctor recommends that you wear a sling, use it as directed. Do not take it off before your doctor tells you to. · Put ice or a cold pack on the sore area for 10 to 20 minutes at a time. Put a thin cloth between the ice and your skin. · If there is no swelling, you can put moist heat, a heating pad, or a warm cloth on your shoulder. Some doctors suggest alternating between hot and cold. · Rest your shoulder for a few days. If your doctor recommends it, you can then begin gentle exercise of the shoulder, but do not lift anything heavy. When should you call for help? Call 911 anytime you think you may need emergency care. For example, call if:    · You have chest pain or pressure. This may occur with:  ? Sweating. ?  Shortness of breath. ? Nausea or vomiting. ? Pain that spreads from the chest to the neck, jaw, or one or both shoulders or arms. ? Dizziness or lightheadedness. ? A fast or uneven pulse. After calling 911, chew 1 adult-strength aspirin. Wait for an ambulance. Do not try to drive yourself.     · Your arm or hand is cool or pale or changes color. Call your doctor now or seek immediate medical care if:    · You have signs of infection, such as:  ? Increased pain, swelling, warmth, or redness in your shoulder. ? Red streaks leading from a place on your shoulder. ? Pus draining from an area of your shoulder. ? Swollen lymph nodes in your neck, armpits, or groin. ? A fever. Watch closely for changes in your health, and be sure to contact your doctor if:    · You cannot use your shoulder.     · Your shoulder does not get better as expected. Where can you learn more? Go to http://www.russo.com/  Enter H996 in the search box to learn more about \"Shoulder Pain: Care Instructions. \"  Current as of: November 16, 2020               Content Version: 12.8  © 4593-4545 [employer]. Care instructions adapted under license by [employer] (which disclaims liability or warranty for this information). If you have questions about a medical condition or this instruction, always ask your healthcare professional. [address] any warranty or liability for your use of this information.

## 2021-07-12 DIAGNOSIS — M25.511 RIGHT SHOULDER PAIN, UNSPECIFIED CHRONICITY: ICD-10-CM

## 2021-09-08 ENCOUNTER — TRANSCRIBE ORDER (OUTPATIENT)
Dept: SCHEDULING | Age: 76
End: 2021-09-08

## 2021-09-08 DIAGNOSIS — Z12.31 VISIT FOR SCREENING MAMMOGRAM: Primary | ICD-10-CM

## 2021-09-10 ENCOUNTER — HOSPITAL ENCOUNTER (OUTPATIENT)
Dept: PHYSICAL THERAPY | Age: 76
Discharge: HOME | End: 2021-09-10
Payer: MEDICARE

## 2021-09-10 PROCEDURE — 97110 THERAPEUTIC EXERCISES: CPT

## 2021-09-10 PROCEDURE — 97162 PT EVAL MOD COMPLEX 30 MIN: CPT

## 2021-09-10 PROCEDURE — 97535 SELF CARE MNGMENT TRAINING: CPT

## 2021-09-10 NOTE — PROGRESS NOTES
PT DAILY TREATMENT NOTE 10-18    Patient Name: Denise Goode  Date:9/10/2021  : 1945  [x]  Patient  Verified  Payor: [place] MEDICARE / Plan: [place] MEDICARE PART A & B / Product Type: Medicare /    In time:258  Out time:338  Total Treatment Time (min): 40  Visit #: 1 of 12    Medicare/BCBS Only   Total Timed Codes (min):  23 1:1 Treatment Time:  40       Treatment Area: Lower extremity weakness [R29.898]    SUBJECTIVE  Pain Level (0-10 scale): 0  Any medication changes, allergies to medications, adverse drug reactions, diagnosis change, or new procedure performed?: [x] No    [] Yes (see summary sheet for update)  Subjective functional status/changes:   [] No changes reported  See eval    OBJECTIVE      17 min [x]Eval                  []Re-Eval       13 min Therapeutic Exercise:  [] See flow sheet :   Rationale: increase strength to improve the patients ability to perform daily activities      10 min Self Care/Home Management: HEP progression; LE strength correlates to stability   Rationale: increase strength, improve coordination, improve balance and increase proprioception  to improve the patients ability to perform ADLs    With   [] TE   [] TA   [] neuro   [] other: Patient Education: [x] Review HEP    [] Progressed/Changed HEP based on:   [] positioning   [] body mechanics   [] transfers   [] heat/ice application    [] other:      Other Objective/Functional Measures:      Pain Level (0-10 scale) post treatment: 0    ASSESSMENT/Changes in Function: see POC    Patient will continue to benefit from skilled PT services to modify and progress therapeutic interventions, address functional mobility deficits, address strength deficits, analyze and cue movement patterns and address imbalance/dizziness to attain remaining goals. [x]  See Plan of Care  []  See progress note/recertification  []  See Discharge Summary         Progress towards goals / Updated goals:  Short Term Goals:  To be accomplished in 2 weeks: 1. I and compliant with HEP for self management of symptoms. IE: issued HEP  2. Perform Romberg on compromised surface x 30\" without LOB to increase ease with ADLs. IE: 20\" on even surface  Long Term Goals: To be accomplished in 6 weeks:  1. Improve FOTO to 58 to indicate improved function with daily activities. IE: 50  2. Increase B hip strength to grossly 4-/5 to increase stability for community ambulation. IE: 3/5 B  3. Increase B hamstring strength to 4/5 to increase ease with negotiating stairs. IE: 3+  4. Increase DGI by >=3 points to indicate a decreased risk for falls. IE: 15/24  PLAN  []  Upgrade activities as tolerated     [x]  Continue plan of care  []  Update interventions per flow sheet       []  Discharge due to:_  []  Other:_      ZOIE Clayton, CMTPT 9/10/2021  3:46 PM    No future appointments.

## 2021-09-10 NOTE — PROGRESS NOTES
[facility]  [address]  [place], [address]  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 Assessment/ key information: 77y.o. year old female presents with CC of B LE weakness which is affecting her gait and balance. Impairments noted today: decreased strength in B LE globally; decreased static and dynamic balance with a DGI of 15/24 indicating an increase risk for falls. Patient will benefit from physical therapy to address deficits, and ultimately to return patient to prior level of function. Evaluation Complexity History HIGH Complexity :3+ comorbidities / personal factors will impact the outcome/ POC ; Examination MEDIUM Complexity : 3 Standardized tests and measures addressing body structure, function, activity limitation and / or participation in recreation  ;Presentation MEDIUM Complexity : Evolving with changing characteristics  ; Clinical Decision Making MEDIUM Complexity : FOTO score of 26-74  Overall Complexity Rating: MEDIUM  Problem List: decrease strength, impaired gait/ balance, decrease ADL/ functional abilitiies, decrease activity tolerance and decrease flexibility/ joint mobility   Treatment Plan may include any combination of the following: Therapeutic exercise, Neuromuscular re-education, Gait/balance training and Patient education  Patient / Family readiness to learn indicated by: asking questions, trying to perform skills and interest  Persons(s) to be included in education: patient (P)  Barriers to Learning/Limitations: None  Patient Goal (s): to get stornger  Patient Self Reported Health Status: good  Rehabilitation Potential: good    Short Term Goals: To be accomplished in 2 weeks:  1. I and compliant with HEP for self management of symptoms. 2. Perform Romberg on compromised surface x 30\" without LOB to increase ease with ADLs. Long Term Goals: To be accomplished in 6 weeks:  1. Improve FOTO to 58 to indicate improved function with daily activities. 2. Increase B hip strength to grossly 4-/5 to increase stability for community ambulation. 3. Increase B hamstring strength to 4/5 to increase ease with negotiating stairs. 4. Increase DGI by >=3 points to indicate a decreased risk for falls. Frequency / Duration: Patient to be seen 2 times per week for 6 weeks. Patient/ Caregiver education and instruction: Diagnosis, prognosis, exercises   [x]  Plan of care has been reviewed with PTA    Certification Period: 9/10/21-10/10/21  Oleh Ahumada, PT 9/10/2021 3:38 PM    ________________________________________________________________________    I certify that the above Therapy Services are being furnished while the patient is under my care. I agree with the treatment plan and certify that this therapy is necessary.     [de-identified] Signature:____________________  Date:____________Time: _________     Nestor Jordan MD  Please sign and return to [facility] [address] [address] [address]  [place], [address]  [phone] fax

## 2021-09-22 ENCOUNTER — TRANSCRIBE ORDER (OUTPATIENT)
Dept: SCHEDULING | Age: 76
End: 2021-09-22

## 2021-09-22 DIAGNOSIS — N64.4 BREAST PAIN: Primary | ICD-10-CM

## 2021-09-27 ENCOUNTER — HOSPITAL ENCOUNTER (OUTPATIENT)
Dept: PHYSICAL THERAPY | Age: 76
Discharge: HOME | End: 2021-09-27
Payer: MEDICARE

## 2021-09-27 PROCEDURE — 97530 THERAPEUTIC ACTIVITIES: CPT

## 2021-09-27 PROCEDURE — 97110 THERAPEUTIC EXERCISES: CPT

## 2021-09-27 NOTE — PROGRESS NOTES
PT DAILY TREATMENT NOTE     Patient Name: Landon Bhavya  Date:2021  : 1945  [x]  Patient  Verified  Payor: Ramila Ibrahim / Plan: [place] MEDICARE PART A & B / Product Type: Medicare /    In time:14:33  Out time:15:27  Total Treatment Time (min): 47  Visit #: 2 of 12    Medicare/BCBS Only   Total Timed Codes (min):  43 1:1 Treatment Time:  42       Treatment Area: Lower extremity weakness [R29.898]    SUBJECTIVE  Pain Level (0-10 scale): 7 \"right knee\"  Any medication changes, allergies to medications, adverse drug reactions, diagnosis change, or new procedure performed?: [x] No    [] Yes (see summary sheet for update)  Subjective functional status/changes:   [] No changes reported  Patient reports pain in right knee after fall in December that has not improved. As per patient most recent fall on 2021. OBJECTIVE    Modality rationale: decrease pain to improve the patients ability to perform ADLs and rest comfortably following therapy.     Min Type Additional Details    [] Estim:  []Unatt       []IFC  []Premod                        []Other:  []w/ice   []w/heat  Position:   Location:     [] Estim: []Att    []TENS instruct  []NMES                    []Other:  []w/US   []w/ice   []w/heat  Position:   Location:     []  Traction: [] Cervical       []Lumbar                       [] Prone          []Supine                       []Intermittent   []Continuous Lbs:  [] before manual  [] after manual    []  Ultrasound: []Continuous   [] Pulsed                           []1MHz   []3MHz W/cm2:  Location:    []  Iontophoresis with dexamethasone         Location: [] Take home patch   [] In clinic   10' +1' set up [x]  Ice     []  heat  []  Ice massage  []  Laser   []  Anodyne Position: seated  Location: bilat knees    []  Laser with stim  []  Other:  Position:  Location:    []  Vasopneumatic Device    []  Right     []  Left  Pre-treatment girth:  Post-treatment girth:  Measured at (location): Pressure:       [] lo [] med [] hi   Temperature: [] lo [] med [] hi   [x] Skin assessment post-treatment:  [x]intact []redness- no adverse reaction    []redness  adverse reaction:     30/29 (1:1) min Therapeutic Exercise:  [x] See flow sheet :   Rationale: increase ROM and increase strength to improve UE/LE strength/mobility and cervical/lumbar mobility to improve ease of ADLs, household chores, and gait. 13 min Therapeutic Activity:  [x]  See flow sheet :   Rationale: increase strength, improve coordination, improve balance, and increase proprioception to improve the patients ability to perform transfers, stair negotiation, functional overhead reach/lifts, and functional carries. With   [] TE   [] TA   [] neuro   [] other: Patient Education: [x] Review HEP    [] Progressed/Changed HEP based on:   [] positioning   [] body mechanics   [] transfers   [] heat/ice application    [] other:      Other Objective/Functional Measures: initiated PT program today     Pain Level (0-10 scale) post treatment: 4    ASSESSMENT/Changes in Function: Pt seen today for first follow up visit since IE; therapeutic interventions performed as per flow sheet. Patient with use of bilat UE at stairs with lateral step ups with reported pain in left knee with lateral step ups with increased repetitions, will plan to attempt lower step next visit to minimize discomfort. Skilled cues provided to perform therex with proper mechanics. Patient reports partial compliance with HEP provided at initial evaluation.      Patient will continue to benefit from skilled PT services to modify and progress therapeutic interventions, address functional mobility deficits, address ROM deficits, address strength deficits, analyze and address soft tissue restrictions, analyze and cue movement patterns, analyze and modify body mechanics/ergonomics, assess and modify postural abnormalities and address imbalance/dizziness to attain remaining goals and improve functional status, decrease fall risk. []  See Plan of Care  []  See progress note/recertification  []  See Discharge Summary         Progress towards goals / Updated goals:  Short Term Goals: To be accomplished in 2 weeks:  1. I and compliant with HEP for self management of symptoms. Current: reports partial compliance with HEP provided at initial evaluation. (9/27/21)  2. Perform Romberg on compromised surface x 30\" without LOB to increase ease with ADLs. Long Term Goals: To be accomplished in 6 weeks:  1. Improve FOTO to 58 to indicate improved function with daily activities. 2. Increase B hip strength to grossly 4-/5 to increase stability for community ambulation. 3. Increase B hamstring strength to 4/5 to increase ease with negotiating stairs. 4. Increase DGI by >=3 points to indicate a decreased risk for falls.     PLAN  [x]  Upgrade activities as tolerated     [x]  Continue plan of care  []  Update interventions per flow sheet       []  Discharge due to:_  []  Other:_      Herminio Burnette, PT 9/27/2021  16:26 PM    Future Appointments   Date Time Provider Dedra Calderon   9/30/2021  1:45 PM Recel, Anna Anderson, PTA [facility]   10/4/2021  2:45 PM HBV RJ RM 3 3D [facility]   10/4/2021  3:00 PM HBV RJ US RM 1 [facility]   10/6/2021  3:00 PM Naty Smoker, PT [facility]   10/8/2021  3:45 PM Naty Smoker, PT [facility]   10/12/2021  5:15 PM Lizet Nan, PTA [facility]   10/15/2021  5:15 PM Charleen Miguel Aque [facility]   10/19/2021  6:00 PM Lizet Nan, PTA [facility]   10/22/2021  4:30 PM Lizet Nan, PTA [facility]   10/26/2021  6:00 PM Lizet Nan, PTA [facility]   10/29/2021  6:00 PM Charleen Basque [facility]   11/2/2021  6:00 PM Lizet Nan, PTA [facility]

## 2021-09-30 ENCOUNTER — APPOINTMENT (OUTPATIENT)
Dept: PHYSICAL THERAPY | Age: 76
End: 2021-09-30
Payer: MEDICARE

## 2021-10-06 ENCOUNTER — APPOINTMENT (OUTPATIENT)
Dept: PHYSICAL THERAPY | Age: 76
End: 2021-10-06

## 2021-10-08 ENCOUNTER — HOSPITAL ENCOUNTER (OUTPATIENT)
Dept: MAMMOGRAPHY | Age: 76
Discharge: HOME | End: 2021-10-08
Attending: OBSTETRICS & GYNECOLOGY
Payer: MEDICARE

## 2021-10-08 ENCOUNTER — APPOINTMENT (OUTPATIENT)
Dept: PHYSICAL THERAPY | Age: 76
End: 2021-10-08

## 2021-10-08 ENCOUNTER — HOSPITAL ENCOUNTER (OUTPATIENT)
Dept: ULTRASOUND IMAGING | Age: 76
Discharge: HOME | End: 2021-10-08
Attending: OBSTETRICS & GYNECOLOGY
Payer: MEDICARE

## 2021-10-08 DIAGNOSIS — N64.4 BREAST PAIN: ICD-10-CM

## 2021-10-08 PROCEDURE — 77062 BREAST TOMOSYNTHESIS BI: CPT

## 2021-10-12 ENCOUNTER — APPOINTMENT (OUTPATIENT)
Dept: PHYSICAL THERAPY | Age: 76
End: 2021-10-12

## 2021-10-15 ENCOUNTER — APPOINTMENT (OUTPATIENT)
Dept: PHYSICAL THERAPY | Age: 76
End: 2021-10-15

## 2021-10-19 ENCOUNTER — APPOINTMENT (OUTPATIENT)
Dept: PHYSICAL THERAPY | Age: 76
End: 2021-10-19

## 2021-10-22 ENCOUNTER — APPOINTMENT (OUTPATIENT)
Dept: PHYSICAL THERAPY | Age: 76
End: 2021-10-22

## 2021-10-26 ENCOUNTER — APPOINTMENT (OUTPATIENT)
Dept: PHYSICAL THERAPY | Age: 76
End: 2021-10-26

## 2021-10-29 ENCOUNTER — APPOINTMENT (OUTPATIENT)
Dept: PHYSICAL THERAPY | Age: 76
End: 2021-10-29

## 2021-11-01 NOTE — PROGRESS NOTES
[facility]  [address]  [place], [address]  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 I and compliant with HEP for self management of symptoms.   IE: issued HEP  Current: unable to reassess   2. Perform Romberg on compromised surface x 30\" without LOB to increase ease with ADLs. IE: 20\" on even surface  Current: unable to reassess   [address] be accomplished in 6 weeks:  1. Improve FOTO to 58 to indicate improved function with daily activities. IE: 50  Current: unable to reassess   2. Increase B hip strength to grossly 4-/5 to increase stability for community ambulation. IE: 3/5 B  Current: unable to reassess   3. Increase B hamstring strength to 4/5 to increase ease with negotiating stairs. IE: 3+  Current: unable to reassess   4. Increase DGI by >=3 points to indicate a decreased risk for falls. IE: 15/24  Current: unable to reassess   Patient has not returned to PT since 21; unable to reassess goals; unplanned D/C.     ASSESSMENT/RECOMMENDATIONS:  [x]Discontinue therapy: []Patient has reached or is progressing toward set goals      [x]Patient is non-compliant or has abdicated      []Due to lack of appreciable progress towards set [address], PT 11/1/2021 10:41 AM

## 2021-11-02 ENCOUNTER — APPOINTMENT (OUTPATIENT)
Dept: PHYSICAL THERAPY | Age: 76
End: 2021-11-02

## 2022-04-15 ENCOUNTER — OFFICE VISIT (OUTPATIENT)
Dept: ORTHOPEDIC SURGERY | Age: 77
End: 2022-04-15
Payer: MEDICARE

## 2022-04-15 DIAGNOSIS — M17.11 PRIMARY OSTEOARTHRITIS OF RIGHT KNEE: ICD-10-CM

## 2022-04-15 DIAGNOSIS — M25.561 RIGHT KNEE PAIN, UNSPECIFIED CHRONICITY: Primary | ICD-10-CM

## 2022-04-15 DIAGNOSIS — M25.561 RIGHT KNEE PAIN, UNSPECIFIED CHRONICITY: ICD-10-CM

## 2022-04-15 PROCEDURE — G8536 NO DOC ELDER MAL SCRN: HCPCS | Performed by: ORTHOPAEDIC SURGERY

## 2022-04-15 PROCEDURE — 1101F PT FALLS ASSESS-DOCD LE1/YR: CPT | Performed by: ORTHOPAEDIC SURGERY

## 2022-04-15 PROCEDURE — 20611 DRAIN/INJ JOINT/BURSA W/US: CPT | Performed by: ORTHOPAEDIC SURGERY

## 2022-04-15 PROCEDURE — G8432 DEP SCR NOT DOC, RNG: HCPCS | Performed by: ORTHOPAEDIC SURGERY

## 2022-04-15 PROCEDURE — 99214 OFFICE O/P EST MOD 30 MIN: CPT | Performed by: ORTHOPAEDIC SURGERY

## 2022-04-15 PROCEDURE — G8427 DOCREV CUR MEDS BY ELIG CLIN: HCPCS | Performed by: ORTHOPAEDIC SURGERY

## 2022-04-15 PROCEDURE — 1090F PRES/ABSN URINE INCON ASSESS: CPT | Performed by: ORTHOPAEDIC SURGERY

## 2022-04-15 PROCEDURE — G8399 PT W/DXA RESULTS DOCUMENT: HCPCS | Performed by: ORTHOPAEDIC SURGERY

## 2022-04-15 PROCEDURE — G8421 BMI NOT CALCULATED: HCPCS | Performed by: ORTHOPAEDIC SURGERY

## 2022-04-15 RX ORDER — TRIAMCINOLONE ACETONIDE 40 MG/ML
40 INJECTION, SUSPENSION INTRA-ARTICULAR; INTRAMUSCULAR ONCE
Status: COMPLETED | OUTPATIENT
Start: 2022-04-15 | End: 2022-04-15

## 2022-04-15 RX ORDER — BUPROPION HYDROCHLORIDE 150 MG/1
TABLET ORAL
COMMUNITY
Start: 2022-01-29 | End: 2022-09-20

## 2022-04-15 RX ORDER — MELOXICAM 15 MG/1
TABLET ORAL
COMMUNITY
Start: 2022-04-07 | End: 2022-09-20

## 2022-04-15 RX ORDER — METFORMIN HYDROCHLORIDE 500 MG/1
TABLET, EXTENDED RELEASE ORAL
COMMUNITY
Start: 2022-04-01

## 2022-04-15 RX ORDER — LIDOCAINE HYDROCHLORIDE 10 MG/ML
9 INJECTION INFILTRATION; PERINEURAL ONCE
Status: COMPLETED | OUTPATIENT
Start: 2022-04-15 | End: 2022-04-15

## 2022-04-15 RX ADMIN — TRIAMCINOLONE ACETONIDE 40 MG: 40 INJECTION, SUSPENSION INTRA-ARTICULAR; INTRAMUSCULAR at 12:00

## 2022-04-15 RX ADMIN — LIDOCAINE HYDROCHLORIDE 9 ML: 10 INJECTION INFILTRATION; PERINEURAL at 12:00

## 2022-04-15 NOTE — Clinical Note
4/18/2022    Patient: Dagoberto Bloch   YOB: 1945   Date of Visit: 4/15/2022     Arianna Koenig MD  Via     Dear Arianna Koenig MD,      Thank you for referring Ms. Albertina Addison to [facility] [address] [facility] for evaluation. My notes for this consultation are attached. If you have questions, please do not hesitate to call me. I look forward to following your patient along with you.       Sincerely,    Amberly Cary MD

## 2022-04-15 NOTE — PROGRESS NOTES
.Name: Angel Fuentes    : 1945     Service Dept: [address] [facility] Medicine    Chief Complaint   Patient presents with    Knee Pain        There were no vitals taken for this visit. No Known Allergies     Current Outpatient Medications   Medication Sig Dispense Refill    buPROPion XL (WELLBUTRIN XL) 150 mg tablet       meloxicam (MOBIC) 15 mg tablet       metFORMIN ER (GLUCOPHAGE XR) 500 mg tablet       Eliquis 5 mg tablet       escitalopram oxalate (LEXAPRO) 10 mg tablet       escitalopram oxalate (LEXAPRO) 5 mg tablet       Estring 2 mg (7.5 mcg /24 hour) vaginal ring       DULoxetine (CYMBALTA) 30 mg capsule       Bydureon BCise 2 mg/0.85 mL atIn       levothyroxine (SYNTHROID) 100 mcg tablet       Xarelto 15 mg tab tablet       carvediloL (COREG) 3.125 mg tablet       exenatide microspheres (Bydureon) 2 mg/0.65 mL pnij 2 mg by SubCUTAneous route every seven (7) days.  atorvastatin (LIPITOR) 40 mg tablet Take  by mouth daily.  methylphenidate (RITALIN) 20 mg tablet Take 20 mg by mouth three (3) times daily.  ESTROGENS, CONJUGATED (PREMARIN PO) Take  by mouth daily.  magnesium oxide (MAG-OX) 400 mg tablet Take 400 mg by mouth daily.  cholecalciferol (VITAMIN D3) 1,000 unit tablet Take 1,000 Units by mouth daily.  nebivolol (BYSTOLIC) 5 mg tablet Take 5 mg by mouth daily.  levothyroxine (SYNTHROID) 112 mcg tablet Take 0.1 mcg by mouth Daily (before breakfast).  DULoxetine (CYMBALTA) 60 mg capsule Take 1 Cap by mouth daily. 30 Cap 5    esomeprazole (NEXIUM) 40 mg capsule Take  by mouth daily.  pioglitazone (ACTOS) 15 mg tablet Take 30 mg by mouth.        Current Facility-Administered Medications   Medication Dose Route Frequency Provider Last Rate Last Admin    [COMPLETED] lidocaine (XYLOCAINE) 10 mg/mL (1 %) injection 9 mL  9 mL Other ONCE Balwinder Landrum MD   9 mL at 04/15/22 1200    [COMPLETED] triamcinolone acetonide (KENALOG-40) 40 mg/mL injection 40 mg  40 mg Intra artICUlar ONCE Jesus PENNY MD   40 mg at 04/15/22 1200      Patient Active Problem List   Diagnosis Code    Myalgia and myositis, unspecified [id number]    Chronic low back pain M54.50, G89.29    Sacroiliitis, not elsewhere classified ([facility] [address] M46.1    Enthesopathy of hip region M76.899    Pain in limb M79.609    Encounter for long-term (current) use of high-risk medication Z79.899    Fatigue R53.83    Hypothyroidism E03.9    Chronic pain syndrome G89.4    Neck pain M54.2    Low back pain M54.50      Family History   Problem Relation Age of Onset    Hypertension Other     Diabetes Other     Cancer Other     Cancer Mother     Cancer Sister     Cancer Paternal Grandmother     Cancer Brother       Social History     Socioeconomic History    Marital status:    Tobacco Use    Smoking status: Never Smoker    Smokeless tobacco: Never Used   Substance and Sexual Activity    Alcohol use: No    Drug use: No      Past Surgical History:   Procedure Laterality Date    COLONOSCOPY N/A 5/9/2017    COLON[facility] performed by Isabelle Toure MD at [facility] ENDOSCOPY    HX CHOLECYSTECTOMY      HX HYSTERECTOMY  1982    complete    HX KNEE ARTHROSCOPY Left 11/2016      Past Medical History:   Diagnosis Date    Adverse effect of anesthesia     woke up during hysterectomy. Placed back to sleep.  Chronic fatigue syndrome 2008    Onset by [place]    Chronic pain     lower back    Depression     1  time per patient no meds    Diabetes ([facility] [address]     Type 2    Fibromyalgia 2008    onset by [place] De León    Fracture 9/2012    right wrist    Fracture of right wrist 2011    no surgery    GERD (gastroesophageal reflux disease)     Guillain-Baxter syndrome ([facility] [address] 2008    could not see or walk, lost weight, could not write, no respiratory Louis Negus Bear Dania).  Caused by extreme stress    Hiatal hernia     HX OTHER MEDICAL acute poor vision, double vision, need glasses due to [facility].  Hypertension     Hypothyroidism     Insomnia     Lyme disease 2008    Shingles 2008    Unspecified adverse effect of anesthesia     pt states she woke up during hysterectomy and put back to sleep.  Unspecified sleep apnea     not using cpap currently needs new mask        I have reviewed and agree with PFSH and ROS and intake form in chart and the record furthermore I have reviewed prior medical record(s) regarding this patients care during this appointment. Review of Systems:   Patient is a pleasant appearing individual, appropriately dressed, well hydrated, well nourished, who is alert, appropriately oriented for age, and in no acute distress with a normal gait and normal affect who does not appear to be in any significant pain. Physical Exam:  Right Knee -Decrease range of motion with flexion, Knee arc of greater than 50 degrees, Some crepitation, Grossly neurovascularly intact, Good cap refill, No skin lesion, Moderate swelling, No gross instability, Some quadriceps weakness, Kellgren and Elijah at least grade 3    Left Knee - Full Range of Motion, No crepitation, Grossly neurovascularly intact, Good cap refill, No skin lesion, No swelling, No gross instability, No quadriceps weakness    Procedure Documentation:    I discussed in detail the risks, benefits and complications of an injection which included but are not limited to infection, skin reactions, hot swollen joint, and anaphylaxis with the patient. The patient verbalized understanding and gave informed consent for the injection. The patient's knee was flexed to 90° and the skin prepped using sterile alcohol solution. A sterile needle was inserted into the right knee and the mixture of 9 mL Lidocaine 1%, 1 mL Kenalog 40 mg was injected under sterile technique. The needle was withdrawn and the puncture site sealed with a Band-Aid.       Technique: Under sterile conditions a Snapwire ultrasound unit with a variable frequency (7.0-14.0 MHz) linear transducer was used to localize the placement of needle into the right knee joint. Findings: Successful needle placement for knee injection. Final images were taken and saved for permanent record. The patient tolerated the injection well. The patient was instructed to call the office immediately if there is any pain, redness, warmth, fever, or chills. Encounter Diagnoses     ICD-10-CM ICD-9-CM   1. Right knee pain, unspecified chronicity  M25.561 719.46   2. Primary osteoarthritis of right knee  M17.11 715.16       HPI:  The patient is here with a chief complaint of right knee pain, throbbing, burning pain. Progressively getting worse. Pain is 10/10. X-rays are positive for severe OA in the past.    Assessment/Plan:  Plan will be for right total knee replacement, general medical clearance, vascular clearance. She has got a history of blood clots. She is on Eliquis for it, but no surgical complications. We will give her cortisone injection today in the right knee and go from there. As part of continued conservative pain management options the patient was advised to utilize Tylenol or OTC NSAIDS as long as it is not medically contraindicated. Return to Office: Follow-up and Dispositions    · Return for schedule for surgery. Administrations This Visit     lidocaine (XYLOCAINE) 10 mg/mL (1 %) injection 9 mL     Admin Date  04/15/2022 Action  Given Dose  9 mL Route  Other Administered By  Sean Randle LPN          triamcinolone acetonide (KENALOG-40) 40 mg/mL injection 40 mg     Admin Date  04/15/2022 Action  Given Dose  40 mg Route  Intra artICUlar Administered By  Sean Randle LPN               Scribed by Bill Carlton LPN as dictated by [facility] RESPONSE [place] ROLAND Yi MD.  Documentation True and Accepted Balwinder Yi MD

## 2022-04-15 NOTE — PATIENT INSTRUCTIONS
Knee Arthritis: Care Instructions  Your Care Instructions     Knee arthritis is a breakdown of the cartilage that cushions your knee joint. When the cartilage wears down, your bones rub against each other. This causes pain and stiffness. Knee arthritis tends to get worse with time. Treatment for knee arthritis involves reducing pain, making the leg muscles stronger, and staying at a healthy body weight. The treatment usually does not improve the health of the cartilage, but it can reduce pain and improve how well your knee works. You can take simple measures to protect your knee joints, ease your pain, and help you stay active. Follow-up care is a key part of your treatment and safety. Be sure to make and go to all appointments, and call your doctor if you are having problems. It's also a good idea to know your test results and keep a list of the medicines you take. How can you care for yourself at home? · Know that knee arthritis will cause more pain on some days than on others. · Stay at a healthy weight. Lose weight if you are overweight. When you stand up, the pressure on your knees from every pound of body weight is multiplied four times. So if you lose 10 pounds, you will reduce the pressure on your knees by 40 pounds. · Talk to your doctor or physical therapist about exercises that will help ease joint pain. ? Stretch to help prevent stiffness and to prevent injury before you exercise. You may enjoy gentle forms of yoga to help keep your knee joints and muscles flexible. ? Walk instead of jog.  ? Ride a bike. This makes your thigh muscles stronger and takes pressure off your knee. ? Wear well-fitting and comfortable shoes. ? Exercise in chest-deep water. This can help you exercise longer with less pain. ? Avoid exercises that include squatting or kneeling. They can put a lot of strain on your knees.   ? Talk to your doctor to make sure that the exercise you do is not making the arthritis worse.  · Do not sit for long periods of time. Try to walk once in a while to keep your knee from getting stiff. · Ask your doctor or physical therapist whether shoe inserts may reduce your arthritis pain. · If you can afford it, get new athletic shoes at least every year. This can help reduce the strain on your knees. · Use a device to help you do everyday activities. ? A cane or walking stick can help you keep your balance when you walk. Hold the cane or walking stick in the hand opposite the painful knee. ? If you feel like you may fall when you walk, try using crutches or a front-wheeled walker. These can prevent falls that could cause more damage to your knee. ? A knee brace may help keep your knee stable and prevent pain. ? You also can use other things to make life easier, such as a higher toilet seat and handrails in the bathtub or shower. · Take pain medicines exactly as directed. ? Do not wait until you are in severe pain. You will get better results if you take it sooner. ? If you are not taking a prescription pain medicine, take an over-the-counter medicine such as acetaminophen (Tylenol), ibuprofen (Advil, Motrin), or naproxen (Aleve). Read and follow all instructions on the label. ? Do not take two or more pain medicines at the same time unless the doctor told you to. Many pain medicines have acetaminophen, which is Tylenol. Too much acetaminophen (Tylenol) can be harmful. ? Tell your doctor if you take a blood thinner, have diabetes, or have allergies to shellfish. · Ask your doctor if you might benefit from a shot of steroid medicine into your knee. This may provide pain relief for several months. · Many people take the supplements glucosamine and chondroitin for osteoarthritis. Some people feel they help, but the medical research does not show that they work. Talk to your doctor before you take these supplements. When should you call for help?    Call your doctor now or seek immediate medical care if:    · You have sudden swelling, warmth, or pain in your knee.     · You have knee pain and a fever or rash.     · You have such bad pain that you cannot use your knee. Watch closely for changes in your health, and be sure to contact your doctor if you have any problems. Where can you learn more? Go to http://www.gray.com/  Enter W187 in the search box to learn more about \"Knee Arthritis: Care Instructions. \"  Current as of: December 20, 2021               Content Version: 13.2  © 0974-8647 [employer]. Care instructions adapted under license by [employer] (which disclaims liability or warranty for this information). If you have questions about a medical condition or this instruction, always ask your healthcare professional. [address] any warranty or liability for your use of this information.

## 2022-04-15 NOTE — LETTER
Donis Barros   1945   [id number]       4/15/2022       I hereby authorize and direct Balwinder Rodrigues MD, Sal[place] Morley, and whomever he may designate as his associate to perform upon myself the following procedure:    Injection of: Kenalog, RT KNEE RM 1    If any unforeseen condition arises in the course of the procedure, I further authorize him and his associated and/or assistant(s) to do whatever he/she deems advisable. The nature, purpose, benefits, risks, side effects, likelihood of achieving goals, and potential problems that might occur during recuperation, risks for not receiving the proposed care, treatment and services and alternatives of the procedure have been fully explained to me by my physician including, but not limited to:    Swelling, joint pain, skin pigment changes, worsening of condition, and failure to improve. I acknowledge that no guarantee or assurance has been made to me as to the results that may be obtained or the likelihood of success.                 _______________________________________     Signature of patient or authorized representative                [employer] and [facility] fax: [phone]

## 2022-08-11 NOTE — PERIOP NOTES
PRE-SURGICAL INSTRUCTIONS        Patient's Name:  Hermelinda Mtz      Today's Date:  8/11/2022            Covid Testing Date and Time:    Surgery Date:  8/15/2022                Do NOT eat or drink anything, including candy, gum, or ice chips after midnight on 8/14/2022, unless you have specific instructions from your surgeon or anesthesia provider to do so. You may brush your teeth before coming to the hospital.  No smoking 24 hours prior to the day of surgery. No alcohol 24 hours prior to the day of surgery. No recreational drugs for one week prior to the day of surgery. Leave all valuables, including money/purse, at home. Remove all jewelry, nail polish, acrylic nails, and makeup (including mascara); no lotions powders, deodorant, or perfume/cologne/after shave on the skin. Follow instruction for Hibiclens washes and CHG wipes from surgeon's office. Glasses/contact lenses and dentures may be worn to the hospital.  They will be removed prior to surgery. Call your doctor if symptoms of a cold or illness develop within 24-48 hours prior to your surgery. 11.  If you are having an outpatient procedure, please make arrangements for a responsible ADULT TO [address] [place] [address] and stay with you for 24 hours after your surgery. 12. ONE VISITOR in the hospital at this time for outpatient procedures. Exceptions may be made for surgical admissions, per nursing unit guidelines      Special Instructions:      Bring list of CURRENT medications. Bring inhaler. Bring CPAP machine. Bring any pertinent legal medical records. Take these medications the morning of surgery with a sip of water:  BP meds and per MD  Follow physician instructions about insulin. Follow physician instructions about stopping anticoagulants. Complete bowel prep per MD instructions. On the day of surgery, come in the main entrance of [facility].   Let the  at the desk know you are there for surgery. A staff member will come escort you to the surgical area on the second floor. If you have any questions or concerns, please do not hesitate to call:     (Prior to the day of surgery) [facility] department:  [phone]   (Day of surgery) Pre-Op department:  [phone]    These surgical instructions were reviewed with patient during the PAT phone call.

## 2022-08-12 ENCOUNTER — ANESTHESIA EVENT (OUTPATIENT)
Dept: ENDOSCOPY | Age: 77
End: 2022-08-12
Payer: MEDICARE

## 2022-08-15 ENCOUNTER — HOSPITAL ENCOUNTER (OUTPATIENT)
Age: 77
Setting detail: OUTPATIENT SURGERY
Discharge: HOME | End: 2022-08-15
Attending: INTERNAL MEDICINE | Admitting: INTERNAL MEDICINE
Payer: MEDICARE

## 2022-08-15 ENCOUNTER — ANESTHESIA (OUTPATIENT)
Dept: ENDOSCOPY | Age: 77
End: 2022-08-15
Payer: MEDICARE

## 2022-08-15 VITALS
HEART RATE: 91 BPM | BODY MASS INDEX: 30.91 KG/M2 | RESPIRATION RATE: 18 BRPM | OXYGEN SATURATION: 100 % | TEMPERATURE: 97.4 F | HEIGHT: 62 IN | DIASTOLIC BLOOD PRESSURE: 78 MMHG | WEIGHT: 168 LBS | SYSTOLIC BLOOD PRESSURE: 141 MMHG

## 2022-08-15 LAB
GLUCOSE BLD STRIP.AUTO-MCNC: 167 MG/DL (ref 70–110)
GLUCOSE BLD STRIP.AUTO-MCNC: 96 MG/DL (ref 70–110)

## 2022-08-15 PROCEDURE — 76040000007: Performed by: INTERNAL MEDICINE

## 2022-08-15 PROCEDURE — 99100 ANES PT EXTEME AGE<1 YR&>70: CPT | Performed by: ANESTHESIOLOGY

## 2022-08-15 PROCEDURE — 88305 TISSUE EXAM BY PATHOLOGIST: CPT

## 2022-08-15 PROCEDURE — 77030019988 HC FCPS ENDOSC DISP BSC -B: Performed by: INTERNAL MEDICINE

## 2022-08-15 PROCEDURE — 74011250636 HC RX REV CODE- 250/636: Performed by: NURSE ANESTHETIST, CERTIFIED REGISTERED

## 2022-08-15 PROCEDURE — 2709999900 HC NON-CHARGEABLE SUPPLY: Performed by: INTERNAL MEDICINE

## 2022-08-15 PROCEDURE — 00813 ANES UPR LWR GI NDSC PX: CPT | Performed by: NURSE ANESTHETIST, CERTIFIED REGISTERED

## 2022-08-15 PROCEDURE — 99100 ANES PT EXTEME AGE<1 YR&>70: CPT | Performed by: NURSE ANESTHETIST, CERTIFIED REGISTERED

## 2022-08-15 PROCEDURE — 82962 GLUCOSE BLOOD TEST: CPT

## 2022-08-15 PROCEDURE — 00813 ANES UPR LWR GI NDSC PX: CPT | Performed by: ANESTHESIOLOGY

## 2022-08-15 PROCEDURE — 76060000032 HC ANESTHESIA 0.5 TO 1 HR: Performed by: INTERNAL MEDICINE

## 2022-08-15 PROCEDURE — 77030008565 HC TBNG SUC IRR ERBE -B: Performed by: INTERNAL MEDICINE

## 2022-08-15 PROCEDURE — 74011000250 HC RX REV CODE- 250: Performed by: NURSE ANESTHETIST, CERTIFIED REGISTERED

## 2022-08-15 RX ORDER — PROPOFOL 10 MG/ML
INJECTION, EMULSION INTRAVENOUS AS NEEDED
Status: DISCONTINUED | OUTPATIENT
Start: 2022-08-15 | End: 2022-08-15 | Stop reason: HOSPADM

## 2022-08-15 RX ORDER — INSULIN LISPRO 100 [IU]/ML
INJECTION, SOLUTION INTRAVENOUS; SUBCUTANEOUS ONCE
Status: CANCELLED | OUTPATIENT
Start: 2022-08-15 | End: 2022-08-15

## 2022-08-15 RX ORDER — SODIUM CHLORIDE, SODIUM LACTATE, POTASSIUM CHLORIDE, CALCIUM CHLORIDE 600; 310; 30; 20 MG/100ML; MG/100ML; MG/100ML; MG/100ML
50 INJECTION, SOLUTION INTRAVENOUS CONTINUOUS
Status: DISCONTINUED | OUTPATIENT
Start: 2022-08-15 | End: 2022-08-16 | Stop reason: HOSPADM

## 2022-08-15 RX ORDER — DEXTROMETHORPHAN/PSEUDOEPHED 2.5-7.5/.8
1.2 DROPS ORAL
Status: CANCELLED | OUTPATIENT
Start: 2022-08-15

## 2022-08-15 RX ORDER — SODIUM CHLORIDE 0.9 % (FLUSH) 0.9 %
5-40 SYRINGE (ML) INJECTION EVERY 8 HOURS
Status: CANCELLED | OUTPATIENT
Start: 2022-08-15

## 2022-08-15 RX ORDER — LIDOCAINE HYDROCHLORIDE 10 MG/ML
0.1 INJECTION, SOLUTION EPIDURAL; INFILTRATION; INTRACAUDAL; PERINEURAL AS NEEDED
Status: DISCONTINUED | OUTPATIENT
Start: 2022-08-15 | End: 2022-08-16 | Stop reason: HOSPADM

## 2022-08-15 RX ORDER — DEXTROSE MONOHYDRATE 100 MG/ML
0-250 INJECTION, SOLUTION INTRAVENOUS AS NEEDED
Status: CANCELLED | OUTPATIENT
Start: 2022-08-15

## 2022-08-15 RX ORDER — HYDROMORPHONE HYDROCHLORIDE 2 MG/ML
0.5 INJECTION, SOLUTION INTRAMUSCULAR; INTRAVENOUS; SUBCUTANEOUS AS NEEDED
Status: CANCELLED | OUTPATIENT
Start: 2022-08-15

## 2022-08-15 RX ORDER — SODIUM CHLORIDE 0.9 % (FLUSH) 0.9 %
5-40 SYRINGE (ML) INJECTION AS NEEDED
Status: CANCELLED | OUTPATIENT
Start: 2022-08-15

## 2022-08-15 RX ORDER — FLUMAZENIL 0.1 MG/ML
0.2 INJECTION INTRAVENOUS
Status: CANCELLED | OUTPATIENT
Start: 2022-08-15 | End: 2022-08-15

## 2022-08-15 RX ORDER — LIDOCAINE HYDROCHLORIDE 20 MG/ML
INJECTION, SOLUTION EPIDURAL; INFILTRATION; INTRACAUDAL; PERINEURAL AS NEEDED
Status: DISCONTINUED | OUTPATIENT
Start: 2022-08-15 | End: 2022-08-15 | Stop reason: HOSPADM

## 2022-08-15 RX ORDER — EPINEPHRINE 0.1 MG/ML
1 INJECTION INTRACARDIAC; INTRAVENOUS
Status: CANCELLED | OUTPATIENT
Start: 2022-08-15 | End: 2022-08-16

## 2022-08-15 RX ORDER — MAGNESIUM SULFATE 100 %
4 CRYSTALS MISCELLANEOUS AS NEEDED
Status: CANCELLED | OUTPATIENT
Start: 2022-08-15

## 2022-08-15 RX ORDER — NALOXONE HYDROCHLORIDE 0.4 MG/ML
0.4 INJECTION, SOLUTION INTRAMUSCULAR; INTRAVENOUS; SUBCUTANEOUS
Status: CANCELLED | OUTPATIENT
Start: 2022-08-15 | End: 2022-08-15

## 2022-08-15 RX ORDER — ATROPINE SULFATE 0.1 MG/ML
0.5 INJECTION INTRAVENOUS
Status: CANCELLED | OUTPATIENT
Start: 2022-08-15 | End: 2022-08-16

## 2022-08-15 RX ORDER — INSULIN LISPRO 100 [IU]/ML
INJECTION, SOLUTION INTRAVENOUS; SUBCUTANEOUS ONCE
Status: DISCONTINUED | OUTPATIENT
Start: 2022-08-15 | End: 2022-08-15 | Stop reason: HOSPADM

## 2022-08-15 RX ORDER — SODIUM CHLORIDE, SODIUM LACTATE, POTASSIUM CHLORIDE, CALCIUM CHLORIDE 600; 310; 30; 20 MG/100ML; MG/100ML; MG/100ML; MG/100ML
75 INJECTION, SOLUTION INTRAVENOUS CONTINUOUS
Status: CANCELLED | OUTPATIENT
Start: 2022-08-15 | End: 2022-08-19

## 2022-08-15 RX ORDER — ONDANSETRON 2 MG/ML
4 INJECTION INTRAMUSCULAR; INTRAVENOUS ONCE
Status: CANCELLED | OUTPATIENT
Start: 2022-08-15 | End: 2022-08-15

## 2022-08-15 RX ADMIN — PROPOFOL 60 MG: 10 INJECTION, EMULSION INTRAVENOUS at 09:27

## 2022-08-15 RX ADMIN — LIDOCAINE HYDROCHLORIDE 40 MG: 20 INJECTION, SOLUTION EPIDURAL; INFILTRATION; INTRACAUDAL; PERINEURAL at 09:27

## 2022-08-15 RX ADMIN — PROPOFOL 50 MG: 10 INJECTION, EMULSION INTRAVENOUS at 09:42

## 2022-08-15 RX ADMIN — PROPOFOL 50 MG: 10 INJECTION, EMULSION INTRAVENOUS at 09:37

## 2022-08-15 RX ADMIN — FAMOTIDINE 20 MG: 10 INJECTION, SOLUTION INTRAVENOUS at 09:17

## 2022-08-15 RX ADMIN — PROPOFOL 40 MG: 10 INJECTION, EMULSION INTRAVENOUS at 09:32

## 2022-08-15 RX ADMIN — SODIUM CHLORIDE, POTASSIUM CHLORIDE, SODIUM LACTATE AND CALCIUM CHLORIDE 50 ML/HR: 600; 310; 30; 20 INJECTION, SOLUTION INTRAVENOUS at 09:17

## 2022-08-15 NOTE — ANESTHESIA POSTPROCEDURE EVALUATION
Procedure(s):  UPPER ENDOSCOPY with bxs and dilation  COLONOSCOPY. MAC    Anesthesia Post Evaluation      Multimodal analgesia: multimodal analgesia used between 6 hours prior to anesthesia start to PACU discharge  Patient location during evaluation: bedside  Patient participation: complete - patient participated  Level of consciousness: awake  Pain management: adequate  Airway patency: patent  Anesthetic complications: no  Cardiovascular status: stable  Respiratory status: acceptable  Hydration status: acceptable  Post anesthesia nausea and vomiting:  controlled  Final Post Anesthesia Temperature Assessment:  Normothermia (36.0-37.5 degrees C)      INITIAL Post-op Vital signs:   Vitals Value Taken Time   /53 08/15/22 1008   Temp 36.4 °C (97.6 °F) 08/15/22 1003   Pulse 89 08/15/22 1012   Resp 14 08/15/22 1012   SpO2 100 % 08/15/22 1012   Vitals shown include unvalidated device data.

## 2022-08-15 NOTE — H&P
[facility]  [phone]    GASTROENTEROLOGY Pre-Procedure H and P      Impression/Plan:   1. This patient is consented for an EGD and colonoscopy for dysphagia and colon cancer screening. Chief Complaint: dysphagia and colon cancer screening. HPI:  Ann Mojica is a 69 y.o. female who presents for an EGD and colonoscopy for evaluation of dysphagia and colon cancer screening. PMH:   Past Medical History:   Diagnosis Date    Adverse effect of anesthesia     woke up during hysterectomy. Placed back to sleep. Chronic fatigue syndrome 2008    Onset by Janet Blackwell    Chronic pain     lower back    Depression     1  time per patient no meds    Diabetes ([facility] [address]     Type 2    Fibromyalgia 2008    onset by Janet Blackwell    Fracture 09/2012    right wrist    Fracture of right wrist 2011    no surgery    GERD (gastroesophageal reflux disease)     Guillain-Guanica syndrome ([facility] [address] 2008    could not see or walk, lost weight, could not write, no respiratory Mozella Kiran Najera). Caused by extreme stress    Hiatal hernia     HX OTHER MEDICAL     acute poor vision, double vision, need glasses due to [facility]. Hypertension     Hypothyroidism     Insomnia     Lyme disease 2008    Shingles 2008    Thromboembolus [facility]) 2021    Eliquis DC'sasha    Unspecified adverse effect of anesthesia     pt states she woke up during hysterectomy and put back to sleep.     Unspecified sleep apnea     not using cpap currently needs new mask       PSH:   Past Surgical History:   Procedure Laterality Date    COLONOSCOPY N/A 5/9/2017    COLONOSCOPY performed by Yash Michael MD at [facility] ENDOSCOPY    HX CHOLECYSTECTOMY      HX HYSTERECTOMY  1982    complete    HX KNEE ARTHROSCOPY Left 11/2016       Social HX:   Social History     Socioeconomic History    Marital status:      Spouse name: Not on file    Number of children: Not on file    Years of education: Not on file    Highest education level: Not on file Occupational History    Not on file   Tobacco Use    Smoking status: Never    Smokeless tobacco: Never   Substance and Sexual Activity    Alcohol use: No    Drug use: No    Sexual activity: Not on file   Other Topics Concern    Not on file   Social History Narrative    Not on file     Social Determinants of Health     Financial Resource Strain: Not on file   Food Insecurity: Not on file   Transportation Needs: Not on file   Physical Activity: Not on file   Stress: Not on file   Social Connections: Not on file   Intimate Partner Violence: Not on file   Housing Stability: Not on file       FHX:   Family History   Problem Relation Age of Onset    Hypertension Other     Diabetes Other     Cancer Other     Cancer Mother     Cancer Sister     Cancer Paternal Grandmother     Cancer Brother        Allergy:   No Known Allergies    Home Medications:     Medications Prior to Admission   Medication Sig    buPROPion XL (WELLBUTRIN XL) 150 mg tablet     Bydureon BCise 2 mg/0.85 mL atIn     carvediloL (COREG) 3.125 mg tablet     cholecalciferol (VITAMIN D3) (1000 Units /25 mcg) tablet Take 1,000 Units by mouth daily. esomeprazole (NEXIUM) 40 mg capsule Take  by mouth daily. meloxicam (MOBIC) 15 mg tablet     metFORMIN ER (GLUCOPHAGE XR) 500 mg tablet     escitalopram oxalate (LEXAPRO) 10 mg tablet     escitalopram oxalate (LEXAPRO) 5 mg tablet     Estring 2 mg (7.5 mcg /24 hour) vaginal ring     DULoxetine (CYMBALTA) 30 mg capsule     levothyroxine (SYNTHROID) 100 mcg tablet     Xarelto 15 mg tab tablet     exenatide microspheres (Bydureon) 2 mg/0.65 mL pnij 2 mg by SubCUTAneous route every seven (7) days. atorvastatin (LIPITOR) 40 mg tablet Take  by mouth daily. (Patient not taking: Reported on 8/11/2022)    methylphenidate HCl (RITALIN) 20 mg tablet Take 20 mg by mouth three (3) times daily. ESTROGENS, CONJUGATED (PREMARIN PO) Take  by mouth daily.     magnesium oxide (MAG-OX) 400 mg tablet Take 400 mg by mouth daily. nebivoloL (BYSTOLIC) 5 mg tablet Take 5 mg by mouth daily. levothyroxine (SYNTHROID) 112 mcg tablet Take 0.1 mcg by mouth Daily (before breakfast). DULoxetine (CYMBALTA) 60 mg capsule Take 1 Cap by mouth daily. pioglitazone (ACTOS) 15 mg tablet Take 30 mg by mouth. Review of Systems:     A complete 10 point review of systems was performed and pertinents are as per the HPI. Remainder of the review of systems was negative. Visit Vitals  /78 (BP 1 Location: Right arm, BP Patient Position: At rest)   Pulse (!) 107   Temp 98.3 °F (36.8 °C)   Resp 20   Ht 5' 2\" (1.575 m)   Wt 76.2 kg (168 lb)   SpO2 97%   Breastfeeding No   BMI 30.73 kg/m²       Physical Assessment:     General: alert, cooperative, no acute distress, appears stated age. HEENT: normocephalic, no scleral icterus, moist mucous membranes, EOMs intact, no neck masses noted. Respiratory: lungs clear to auscultation bilaterally. Cardiovascular: regular rate and rhythm, no murmurs, rubs or gallops. Abdomen: normal bowel sounds, soft, non-tender to palpation. Extremities: no lower extremity edema, no cyanosis or clubbing. Neuro: alert and oriented x 3; non-focal exam.  Skin: no rashes. Psych: normal mood and affect. Stefano Crooks MD  [facility]  [email or web address]  Phone: [phone]  Pager: [phone]  [email or web address]

## 2022-08-15 NOTE — DISCHARGE INSTRUCTIONS
Upper GI Endoscopy: What to Expect at [address] had an upper GI endoscopy. Your doctor used a thin, lighted tube that bends to look at the inside of your esophagus, your stomach, and the first part of the small intestine, called the duodenum. After you have an endoscopy, you will stay at the hospital or clinic for 1 to 2 hours. This will allow the medicine to wear off. You will be able to go home after your doctor or nurse checks to make sure that you're not having any problems. You may have to stay overnight if you had treatment during the test. You may have a sore throat for a day or two after the test.  This care sheet gives you a general idea about what to expect after the test.  How can you care for yourself at home? Activity   Rest as much as you need to after you go home. You should be able to go back to your usual activities the day after the test.  Diet   Follow your doctor's directions for eating after the test.  Drink plenty of fluids (unless your doctor has told you not to). Medications   If you have a sore throat the day after the test, use an over-the-counter spray to numb your throat. Follow-up care is a key part of your treatment and safety. Be sure to make and go to all appointments, and call your doctor if you are having problems. It's also a good idea to know your test results and keep a list of the medicines you take. When should you call for help? Call 911 anytime you think you may need emergency care. For example, call if:    You passed out (lost consciousness). You have trouble breathing. You pass maroon or bloody stools. Call your doctor now or seek immediate medical care if:    You have pain that does not get better after your take pain medicine. You have new or worse belly pain. You have blood in your stools. You are sick to your stomach and cannot keep fluids down. You have a fever. You cannot pass stools or gas.    Watch closely for changes in your health, and be sure to contact your doctor if:    Your throat still hurts after a day or two. You do not get better as expected. Where can you learn more? Go to http://www.[employer].com/  Enter J454 in the search box to learn more about \"Upper GI Endoscopy: What to Expect at Home. \"  Current as of: September 8, 2021               Content Version: 13.2  © 2006-2022 [employer]. Care instructions adapted under license by [employer] (which disclaims liability or warranty for this information). If you have questions about a medical condition or this instruction, always ask your healthcare professional. [address] any warranty or liability for your use of this information. Esophageal Dilation: What to Expect at [address] had an esophageal dilation. This procedure can open up narrow areas of the esophagus. After the procedure, you will stay at the hospital or surgery center for 1 to 2 hours. This will allow the medicine to wear off. You will be able to go home after your doctor or nurse checks to make sure that you're not having any problems. This care sheet gives you a general idea about how long it will take for you to recover. But each person recovers at a different pace. Follow the steps below to get better as quickly as possible. How can you care for yourself at home? Activity    Rest as much as you need to after you go home. You should be able to go back to your usual activities the day after the procedure. Diet    Follow your doctor's directions for eating after the procedure. Drink plenty of fluids (unless your doctor has told you not to). Medicines    Your doctor will tell you if and when you can restart your medicines. He or she will also give you instructions about taking any new medicines.      If you take aspirin or some other blood thinner, ask your doctor if and when to start taking it again. Make sure that you understand exactly what your doctor wants you to do. If you have a sore throat the day after the procedure, use an over-the-counter spray to numb your throat. Sucking on throat lozenges and gargling with warm salt water may also help relieve your symptoms. Follow-up care is a key part of your treatment and safety. Be sure to make and go to all appointments, and call your doctor if you are having problems. It's also a good idea to know your test results and keep a list of the medicines you take. When should you call for help? Call 911 anytime you think you may need emergency care. For example, call if:    You passed out (lost consciousness). You have trouble breathing. Your stools are maroon or very bloody   Call your doctor now or seek immediate medical care if:    You have new or worse belly pain. You have a fever. You have new or more blood in your stools. You are sick to your stomach and cannot drink fluids. You cannot pass stools or gas. You have pain that does not get better after you take pain medicine. Watch closely for changes in your health, and be sure to contact your doctor if:    Your throat still hurts after a day or two. You do not get better as expected. Where can you learn more? Go to http://www.gray.com/  Enter J014 in the search box to learn more about \"Esophageal Dilation: What to Expect at Home. \"  Current as of: September 8, 2021               Content Version: 13.2  © 2006-2022 Healthwise, Incorporated. Care instructions adapted under license by [employer] (which disclaims liability or warranty for this information). If you have questions about a medical condition or this instruction, always ask your healthcare professional. [place] [address] any warranty or liability for your use of this information.      Hiatal Hernia: Care Instructions  Your Care Instructions  A hiatal hernia occurs when part of the stomach bulges into the chest cavity. A hiatal hernia may allow stomach acid and juices to back up into the esophagus (acid reflux). This can cause a feeling of burning, warmth, heat, or pain behind the breastbone. This feeling may often occur after you eat, soon after you lie down, or when you bend forward, and it may come and go. You also may have a sour taste in your mouth. These symptoms are commonly known as heartburn or reflux. But not all hiatal hernias cause symptoms. Follow-up care is a key part of your treatment and safety. Be sure to make and go to all appointments, and call your doctor if you are having problems. It's also a good idea to know your test results and keep a list of the medicines you take. How can you care for yourself at home? Take your medicines exactly as prescribed. Call your doctor if you think you are having a problem with your medicine. Do not take aspirin or other nonsteroidal anti-inflammatory drugs (NSAIDs), such as ibuprofen (Advil, Motrin) or naproxen (Aleve), unless your doctor says it is okay. Ask your doctor what you can take for pain. Your doctor may recommend over-the-counter medicine. For mild or occasional indigestion, antacids such as Tums, Gaviscon, Maalox, or Mylanta may help. Your doctor also may recommend over-the-counter acid reducers, such as famotidine (Pepcid AC), cimetidine (Tagamet HB), or omeprazole (Prilosec). Read and follow all instructions on the label. If you use these medicines often, talk with your doctor. Change your eating habits. It's best to eat several small meals instead of two or three large meals. After you eat, wait 2 to 3 hours before you lie down. Late-night snacks aren't a good idea. Avoid foods that make your symptoms worse. These may include chocolate, mint, alcohol, pepper, spicy foods, high-fat foods, or drinks with caffeine in them, such as tea, coffee, dominick, or energy drinks.  If your symptoms are worse after you eat a certain food, you may want to stop eating it to see if your symptoms get better. Do not smoke or chew tobacco.  If you get heartburn at night, raise the head of your bed 6 to 8 inches by putting the frame on blocks or placing a foam wedge under the head of your mattress. (Adding extra pillows does not work.)  Do not wear tight clothing around your middle. Lose weight if you need to. Losing just 5 to 10 pounds can help. When should you call for help? Call your doctor now or seek immediate medical care if:    You have new or worse belly pain. You are vomiting. Watch closely for changes in your health, and be sure to contact your doctor if:    You have new or worse symptoms of indigestion. You have trouble or pain swallowing. You are losing weight. You do not get better as expected. Where can you learn more? Go to http://www.russo.com/  Enter T074 in the search box to learn more about \"Hiatal Hernia: Care Instructions. \"  Current as of: September 8, 2021               Content Version: 13.2  © 8279-8631 [employer]. Care instructions adapted under license by [employer] (which disclaims liability or warranty for this information). If you have questions about a medical condition or this instruction, always ask your healthcare professional. [place] [address] any warranty or liability for your use of this information. Colonoscopy: What to Expect at [address] [place] [address]  After a colonoscopy, you'll stay at the clinic until you wake up. Then you can go home. But you'll need to arrange for a ride. Your doctor will tell you when you can eat and do your other usual activities. Your doctor will talk to you about when you'll need your next colonoscopy. Your doctor can help you decide how often you need to be checked.  This will depend on the results of your test and your risk for colorectal cancer. After the test, you may be bloated or have gas pains. You may need to pass gas. If a biopsy was done or a polyp was removed, you may have streaks of blood in your stool (feces) for a few days. Problems such as heavy rectal bleeding may not occur until several weeks after the test. This isn't common. But it can happen after polyps are removed. This care sheet gives you a general idea about how long it will take for you to recover. But each person recovers at a different pace. Follow the steps below to get better as quickly as possible. How can you care for yourself at home? Activity    Rest when you feel tired. You can do your normal activities when it feels okay to do so. Diet    Follow your doctor's directions for eating. Unless your doctor has told you not to, drink plenty of fluids. This helps to replace the fluids that were lost during the colon prep. Do not drink alcohol. Medicines    Your doctor will tell you if and when you can restart your medicines. You will also be given instructions about taking any new medicines. If you take aspirin or some other blood thinner, ask your doctor if and when to start taking it again. Make sure that you understand exactly what your doctor wants you to do. If polyps were removed or a biopsy was done during the test, your doctor may tell you not to take aspirin or other anti-inflammatory medicines for a few days. These include ibuprofen (Advil, Motrin) and naproxen (Aleve). Other instructions    For your safety, do not drive or operate machinery until the medicine wears off and you can think clearly. Your doctor may tell you not to drive or operate machinery until the day after your test.     Do not sign legal documents or make major decisions until the medicine wears off and you can think clearly. The anesthesia can make it hard for you to fully understand what you are agreeing to.    Follow-up care is a key part of your treatment and safety. Be sure to make and go to all appointments, and call your doctor if you are having problems. It's also a good idea to know your test results and keep a list of the medicines you take. When should you call for help? Call 911 anytime you think you may need emergency care. For example, call if:    You passed out (lost consciousness). You pass maroon or bloody stools. You have trouble breathing. Call your doctor now or seek immediate medical care if:    You have pain that does not get better after you take pain medicine. You are sick to your stomach or cannot drink fluids. You have new or worse belly pain. You have blood in your stools. You have a fever. You cannot pass stools or gas. Watch closely for changes in your health, and be sure to contact your doctor if you have any problems. Where can you learn more? Go to http://www.gray.com/  Enter E264 in the search box to learn more about \"Colonoscopy: What to Expect at Home. \"  Current as of: September 8, 2021               Content Version: 13.2  © 2006-2022 [employer]. Care instructions adapted under license by [employer] (which disclaims liability or warranty for this information). If you have questions about a medical condition or this instruction, always ask your healthcare professional. [address] any warranty or liability for your use of this information. Diverticulosis: Care Instructions  Your Care Instructions  In diverticulosis, pouches called diverticula form in the wall of the large intestine (colon). The pouches do not cause any pain or other symptoms. Most people who have diverticulosis do not know they have it. But the pouches sometimes bleed, and if they become infected, they can cause pain and other symptoms. When this happens, it is called diverticulitis.   Diverticula form when pressure pushes the wall of the colon outward at certain weak points. A diet that is too low in fiber can cause diverticula. Follow-up care is a key part of your treatment and safety. Be sure to make and go to all appointments, and call your doctor if you are having problems. It's also a good idea to know your test results and keep a list of the medicines you take. How can you care for yourself at home? Include fruits, leafy green vegetables, beans, and whole grains in your diet each day. These foods are high in fiber. Take a fiber supplement, such as Citrucel or Metamucil, every day if needed. Read and follow all instructions on the label. Drink plenty of fluids. If you have kidney, heart, or liver disease and have to limit fluids, talk with your doctor before you increase the amount of fluids you drink. Get at least 30 minutes of exercise on most days of the week. Walking is a good choice. You also may want to do other activities, such as running, swimming, cycling, or playing tennis or team sports. Cut out foods that cause gas, pain, or other symptoms. When should you call for help? Call your doctor now or seek immediate medical care if:    You have belly pain. You pass maroon or very bloody stools. You have a fever. You have nausea and vomiting. You have unusual changes in your bowel movements or abdominal swelling. You have burning pain when you urinate. You have abnormal vaginal discharge. You have shoulder pain. You have cramping pain that does not get better when you have a bowel movement or pass gas. You pass gas or stool from your urethra while urinating. Watch closely for changes in your health, and be sure to contact your doctor if you have any problems. Where can you learn more? Go to http://www.gray.com/  Enter [id number] in the search box to learn more about \"Diverticulosis: Care Instructions. \"  Current as of: September 8, 2021               Content Version: 13.2  © 5469-5560 Healthwise, Incorporated. Care instructions adapted under license by [employer] (which disclaims liability or warranty for this information). If you have questions about a medical condition or this instruction, always ask your healthcare professional. [address] any warranty or liability for your use of this information. Hemorrhoids: Care Instructions  Overview     Hemorrhoids are swollen veins that develop in the anal canal. Bleeding during bowel movements, itching, and rectal pain are the most common symptoms. Hemorrhoids can be uncomfortable at times, but rarely are they a serious problem. Most of the time, you can treat them with simple changes to your diet and bowel habits. These changes include eating more fiber and not straining to pass stools. Most hemorrhoids don't need surgery or other treatment unless they are very large and painful or bleed a lot. Follow-up care is a key part of your treatment and safety. Be sure to make and go to all appointments, and call your doctor if you are having problems. It's also a good idea to know your test results and keep a list of the medicines you take. How can you care for yourself at home? Sit in a few inches of warm water (sitz bath) 3 times a day and after bowel movements. The warm water helps with pain and itching. Put ice on your anal area several times a day for 10 minutes at a time. Put a thin cloth between the ice and your skin. Follow this by placing a warm, wet towel on the area for another 10 to 20 minutes. Take pain medicines exactly as directed. If the doctor gave you a prescription medicine for pain, take it as prescribed. If you are not taking a prescription pain medicine, ask your doctor if you can take an over-the-counter medicine. Keep the anal area clean, but be gentle. Use water and a fragrance-free soap, or use baby wipes or medicated pads such as Tucks.   Wear cotton underwear and loose clothing to decrease moisture in the anal area. Eat more fiber. Include foods such as whole-grain breads and cereals, raw vegetables, raw and dried fruits, and beans. Drink plenty of fluids. If you have kidney, heart, or liver disease and have to limit fluids, talk with your doctor before you increase the amount of fluids you drink. Use a stool softener that contains bran or psyllium. You can save money by buying bran or psyllium (available in bulk at most health food stores) and sprinkling it on foods or stirring it into fruit juice. Or you can use a product such as Metamucil or Hydrocil. Practice healthy bowel habits. Go to the bathroom as soon as you have the urge. Avoid straining to pass stools. Relax and give yourself time to let things happen naturally. Do not hold your breath while passing stools. Do not read while sitting on the toilet. Get off the toilet as soon as you have finished. Take your medicines exactly as prescribed. Call your doctor if you think you are having a problem with your medicine. When should you call for help? Call 911 anytime you think you may need emergency care. For example, call if:    You pass maroon or very bloody stools. Call your doctor now or seek immediate medical care if:    You have increased pain. You have increased bleeding. Watch closely for changes in your health, and be sure to contact your doctor if:    Your symptoms have not improved after 3 or 4 days. Where can you learn more? Go to http://www.russo.com/  Enter F228 in the search box to learn more about \"Hemorrhoids: Care Instructions. \"  Current as of: September 8, 2021               Content Version: 13.2  © 3166-7313 "[employer]". Care instructions adapted under license by [employer] (which disclaims liability or warranty for this information).  If you have questions about a medical condition or this instruction, always ask your healthcare professional. Healthwise, Incorporated disclaims any warranty or liability for your use of this information. DISCHARGE SUMMARY from Nurse     POST-PROCEDURE INSTRUCTIONS:    Call your Physician if you:  Observe any excess bleeding. Develop a temperature over 100.5o F. Experience abdominal, shoulder or chest pain. Notice any signs of decreased circulation or nerve impairment to an extremity such as a change in color, persistent numbness, tingling, coldness or increase in pain. Vomit blood or you have nausea and vomiting lasting longer than 4 hours. Are unable to take medications. Are unable to urinate within 8 hours after discharge following general anesthesia or intravenous sedation. For the next 24 hours after receiving general anesthesia or intravenous sedation, or while taking prescription Narcotics, limit your activities:  Do NOT drive a motor vehicle, operate hazard machinery or power tools, or perform tasks that require coordination. The medication you received during your procedure may have some effect on your mental awareness. Do NOT make important personal or business decisions. The medication you received during your procedure may have some effect on your mental awareness. Do NOT drink alcoholic beverages. These drinks do not mix well with the medications that have been given to you. Upon discharge from the hospital, you must be accompanied by a responsible adult. Resume your diet as directed by your physician. Resume medications as your physician has prescribed. Please give a list of your current medications to your Primary Care Provider. Please update this list whenever your medications are discontinued, doses are changed, or new medications (including over-the-counter products) are added. Please carry medication information at all times in case of emergency situations.           These are general instructions for a healthy lifestyle:    No smoking/ No tobacco products/ Avoid exposure to second hand smoke. Surgeon General's Warning:  Quitting smoking now greatly reduces serious risk to your health. Obesity, smoking, and a sedentary lifestyle greatly increase your risk for illness. A healthy diet, regular physical exercise & weight monitoring are important for maintaining a healthy lifestyle  You may be retaining fluid if you have a history of heart failure or if you experience any of the following symptoms:  Weight gain of 3 pounds or more overnight or 5 pounds in a week, increased swelling in our hands or feet or shortness of breath while lying flat in bed. Please call your doctor as soon as you notice any of these symptoms; do not wait until your next office visit. Recognize signs and symptoms of STROKE:  F  -  Face looks uneven  A  -  Arms unable to move or move unevenly  S  -  Speech slurred or non-existent  T  -  Time to call 911 - as soon as signs and symptoms begin - DO NOT go back to bed or wait to see If you get better - TIME IS BRAIN. Colorectal Screening  Colorectal cancer almost always develops from precancerous polyps (abnormal growths) in the colon or rectum. Screening tests can find precancerous polyps, so that they can be removed before they turn into cancer. Screening tests can also find colorectal cancer early, when treatment works best.  Speak with your physician about when you should begin screening and how often you should be tested. [employer] Activation    Thank you for requesting access to [employer]. Please follow the instructions below to securely access and download your online medical record. [employer] allows you to send messages to your doctor, view your test results, renew your prescriptions, schedule appointments, and more. How Do I Sign Up? In your internet browser, go to [email or web address]. Click on the First Time User? Click Here link in the Sign In box. You will see the New Member Sign Up page.   Enter your [employer] Access Code exactly as it appears below. You will not need to use this code after youve completed the sign-up process. If you do not sign up before the expiration date, you must request a new code. Million-2-1 Access Code: Activation code not generated  Current Million-2-1 Status: Active (This is the date your [employer] access code will )    Enter the last four digits of your Social Security Number (xxxx) and Date of Birth (mm/dd/yyyy) as indicated and click Submit. You will be taken to the next sign-up page. Create a [facility] ID. This will be your Million-2-1 login ID and cannot be changed, so think of one that is secure and easy to remember. Create a Million-[employer]-1 password. You can change your password at any time. Enter your Password Reset Question and Answer. This can be used at a later time if you forget your password. Enter your e-mail address. You will receive e-mail notification when new information is available in [address] Click Sign Up. You can now view and download portions of your medical record. Click the [employer]iCure link to download a portable copy of your medical information. Additional Information    If you have questions, please call [phone]. Remember, Million-2-1 is NOT to be used for urgent needs. For medical emergencies, dial 911. Educational references and/or instructions provided during this visit included:    See Attached      APPOINTMENTS:    Per MD Instruction    Discharge information has been reviewed with the patient. The patient verbalized understanding.

## 2022-08-15 NOTE — ANESTHESIA PREPROCEDURE EVALUATION
Anesthetic History     History of awareness of surgery under anesthesia     Comments: During hysterectomy full recall and awakening       Risk Factors for Postoperative nausea/vomiting:       History of postoperative nausea/vomiting? NO       Female? YES       Motion sickness? NO       Intended opioid administration for postoperative analgesia? NO      Smoking Abstinence  Current Smoker? NO  Elective Surgery? YES  Seen preoperatively by anesthesiologist or proxy prior to day of surgery? YES  Pt abstained from smoking 24 hours prior to anesthesia? YES     Review of Systems / Medical History  Patient summary reviewed and pertinent labs reviewed    Pulmonary        Sleep apnea: No treatment           Neuro/Psych         Neuromuscular disease and psychiatric history    Comments: polymyositis     Praveena Ramirez  Cardiovascular    Hypertension: poorly controlled          Hyperlipidemia    Exercise tolerance: >4 METS  Comments: Pt denies recent CP or change in cardiac status.      GI/Hepatic/Renal     GERD: well controlled           Endo/Other    Diabetes: poorly controlled  Hypothyroidism: well controlled  Obesity and arthritis     Other Findings              Physical Exam    Airway  Mallampati: III  TM Distance: 4 - 6 cm  Neck ROM: normal range of motion        Cardiovascular    Rhythm: regular  Rate: normal         Dental    Dentition: Caps/crowns     Pulmonary                 Abdominal  GI exam deferred       Other Findings            Anesthetic Plan    ASA: 3  Anesthesia type: MAC          Induction: Intravenous  Anesthetic plan and risks discussed with: Patient

## 2022-08-15 NOTE — PROCEDURES
[email or web address]  [place], [address]      Brief Procedure Note    Mayc Howell  1945  [id number]    Date of Procedure: 8/15/2022    Preoperative diagnosis: Dysphagia:  787.20 - R13.10  Colon cancer Screening:   V76.51 - Z12.11    Postoperative diagnosis: ENDO: 50Fr dilation, esophagus bxs r/o EOE, GE Junction bxs r/o stearns's, hiatal hernia, nonobstructive dysphagia  COLO: Diverticulosis, hemorrhoids    Type of Anesthesia: MAC (Monitored anesthesia care)    Description of findings: same as post op dx    Procedure: Procedure(s):  UPPER ENDOSCOPY with bxs and dilation  COLONOSCOPY    :  Dr. Radha Christie MD    Assistant(s): Endoscopy Technician-1: Jam Body  Float Staff: Barbie Woods RN    EBL:None    Specimens:   ID Type Source Tests Collected by Time Destination   1 : esophagus bxs Preservative Esophagus  Khadijah Kumari MD 8/15/2022 8529 Pathology   2 [facility]15[facility]2022 0932 Pathology       Findings: See printed and scanned procedure note    Complications: None    Dr. Radha Christie MD  8/15/2022  9:55 AM

## 2022-09-16 ENCOUNTER — ANESTHESIA EVENT (OUTPATIENT)
Dept: SURGERY | Age: 77
End: 2022-09-16
Payer: MEDICARE

## 2022-09-20 ENCOUNTER — HOSPITAL ENCOUNTER (OUTPATIENT)
Dept: GENERAL RADIOLOGY | Age: 77
Discharge: HOME | End: 2022-09-20
Attending: ORTHOPAEDIC SURGERY
Payer: MEDICARE

## 2022-09-20 ENCOUNTER — OFFICE VISIT (OUTPATIENT)
Dept: ORTHOPEDIC SURGERY | Age: 77
End: 2022-09-20
Payer: MEDICARE

## 2022-09-20 ENCOUNTER — HOSPITAL ENCOUNTER (OUTPATIENT)
Dept: LAB | Age: 77
Discharge: HOME | End: 2022-09-20
Payer: MEDICARE

## 2022-09-20 ENCOUNTER — HOSPITAL ENCOUNTER (OUTPATIENT)
Dept: PREADMISSION TESTING | Age: 77
Discharge: HOME | End: 2022-09-20
Payer: MEDICARE

## 2022-09-20 DIAGNOSIS — M17.11 PRIMARY OSTEOARTHRITIS OF RIGHT KNEE: ICD-10-CM

## 2022-09-20 DIAGNOSIS — M25.561 RIGHT KNEE PAIN, UNSPECIFIED CHRONICITY: ICD-10-CM

## 2022-09-20 DIAGNOSIS — M25.561 RIGHT KNEE PAIN, UNSPECIFIED CHRONICITY: Primary | ICD-10-CM

## 2022-09-20 PROCEDURE — G8399 PT W/DXA RESULTS DOCUMENT: HCPCS | Performed by: ORTHOPAEDIC SURGERY

## 2022-09-20 PROCEDURE — 71046 X-RAY EXAM CHEST 2 VIEWS: CPT

## 2022-09-20 PROCEDURE — G8536 NO DOC ELDER MAL SCRN: HCPCS | Performed by: ORTHOPAEDIC SURGERY

## 2022-09-20 PROCEDURE — 1101F PT FALLS ASSESS-DOCD LE1/YR: CPT | Performed by: ORTHOPAEDIC SURGERY

## 2022-09-20 PROCEDURE — G8417 CALC BMI ABV UP PARAM F/U: HCPCS | Performed by: ORTHOPAEDIC SURGERY

## 2022-09-20 PROCEDURE — G8427 DOCREV CUR MEDS BY ELIG CLIN: HCPCS | Performed by: ORTHOPAEDIC SURGERY

## 2022-09-20 PROCEDURE — 1090F PRES/ABSN URINE INCON ASSESS: CPT | Performed by: ORTHOPAEDIC SURGERY

## 2022-09-20 PROCEDURE — G8432 DEP SCR NOT DOC, RNG: HCPCS | Performed by: ORTHOPAEDIC SURGERY

## 2022-09-20 PROCEDURE — 99214 OFFICE O/P EST MOD 30 MIN: CPT | Performed by: ORTHOPAEDIC SURGERY

## 2022-09-20 PROCEDURE — 1123F ACP DISCUSS/DSCN MKR DOCD: CPT | Performed by: ORTHOPAEDIC SURGERY

## 2022-09-20 RX ORDER — ONDANSETRON 4 MG/1
4 TABLET, ORALLY DISINTEGRATING ORAL
Qty: 20 TABLET | Refills: 0 | Status: SHIPPED | OUTPATIENT
Start: 2022-09-20

## 2022-09-20 RX ORDER — METHYLPHENIDATE HYDROCHLORIDE 20 MG/1
20 TABLET ORAL 2 TIMES DAILY
COMMUNITY

## 2022-09-20 RX ORDER — DULOXETIN HYDROCHLORIDE 60 MG/1
60 CAPSULE, DELAYED RELEASE ORAL DAILY
COMMUNITY

## 2022-09-20 RX ORDER — CEPHALEXIN 500 MG/1
500 CAPSULE ORAL EVERY 8 HOURS
Qty: 9 CAPSULE | Refills: 0 | Status: ON HOLD | OUTPATIENT
Start: 2022-09-20 | End: 2022-09-24

## 2022-09-20 RX ORDER — OXYCODONE AND ACETAMINOPHEN 5; 325 MG/1; MG/1
1 TABLET ORAL
Qty: 30 TABLET | Refills: 0 | Status: SHIPPED | OUTPATIENT
Start: 2022-09-20 | End: 2022-09-22 | Stop reason: SDUPTHER

## 2022-09-20 NOTE — PROGRESS NOTES
Name: Yaron Painting    : 1945     Service Dept: [address] [place] [address]    Chief Complaint   Patient presents with    Pre-op Exam    Knee Pain        There were no vitals taken for this visit. No Known Allergies     Current Outpatient Medications   Medication Sig Dispense Refill    buPROPion XL (WELLBUTRIN XL) 150 mg tablet       meloxicam (MOBIC) 15 mg tablet       metFORMIN ER (GLUCOPHAGE XR) 500 mg tablet       escitalopram oxalate (LEXAPRO) 10 mg tablet       escitalopram oxalate (LEXAPRO) 5 mg tablet       Estring 2 mg (7.5 mcg /24 hour) vaginal ring       DULoxetine (CYMBALTA) 30 mg capsule       Bydureon BCise 2 mg/0.85 mL atIn       levothyroxine (SYNTHROID) 100 mcg tablet       Xarelto 15 mg tab tablet       carvediloL (COREG) 3.125 mg tablet       exenatide microspheres (Bydureon) 2 mg/0.65 mL pnij 2 mg by SubCUTAneous route every seven (7) days. methylphenidate HCl (RITALIN) 20 mg tablet Take 20 mg by mouth three (3) times daily. ESTROGENS, CONJUGATED (PREMARIN PO) Take  by mouth daily. magnesium oxide (MAG-OX) 400 mg tablet Take 400 mg by mouth daily. cholecalciferol (VITAMIN D3) (1000 Units /25 mcg) tablet Take 1,000 Units by mouth daily. nebivoloL (BYSTOLIC) 5 mg tablet Take 5 mg by mouth daily. levothyroxine (SYNTHROID) 112 mcg tablet Take 0.1 mcg by mouth Daily (before breakfast). DULoxetine (CYMBALTA) 60 mg capsule Take 1 Cap by mouth daily. 30 Cap 5    esomeprazole (NEXIUM) 40 mg capsule Take  by mouth daily. pioglitazone (ACTOS) 15 mg tablet Take 30 mg by mouth.         Patient Active Problem List   Diagnosis Code    Myalgia and myositis, unspecified [id number]    Chronic low back pain M54.50, G89.29    Sacroiliitis, not elsewhere classified ([facility] M46.1    Enthesopathy of hip region M76.899    Pain in limb M79.609    Encounter for long-term (current) use of high-risk medication Z79.899    Fatigue R53.83 Hypothyroidism E03.9    Chronic pain syndrome G89.4    Neck pain M54.2    Low back pain M54.50      Family History   Problem Relation Age of Onset    Hypertension Other     Diabetes Other     Cancer Other     Cancer Mother     Cancer Sister     Cancer Paternal Grandmother     Cancer Brother       Social History     Socioeconomic History    Marital status:    Tobacco Use    Smoking status: Never    Smokeless tobacco: Never   Substance and Sexual Activity    Alcohol use: No    Drug use: No      Past Surgical History:   Procedure Laterality Date    COLONOSCOPY N/A 5/9/2017    COLONOSCOPY performed by Fahad Garay MD at [facility] ENDOSCOPY    COLONOSCOPY N/A 8/15/2022    COLONOSCOPY performed by Karina Benton MD at [address] [place] [address]    complete    HX KNEE ARTHROSCOPY Left 11/2016      Past Medical History:   Diagnosis Date    Adverse effect of anesthesia     woke up during hysterectomy. Placed back to sleep. 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 Caused by extreme stress    Hiatal hernia     HX OTHER MEDICAL     acute poor vision, double vision, need glasses due to [facility]. Hypertension     Hypothyroidism     Insomnia     Lyme disease 2008    Shingles 2008    Thromboembolus [facility]) 2021    Eliquis DC'sasha    Unspecified adverse effect of anesthesia     pt states she woke up during hysterectomy and put back to sleep.     Unspecified sleep apnea     not using cpap currently needs new mask        I have reviewed and agree with [address] and FELIPE and intake form in chart and the record furthermore I have reviewed prior medical record(s) regarding this patients care during this appointment. Review of Systems:   Patient is a pleasant appearing individual, appropriately dressed, well hydrated, well nourished, who is alert, appropriately oriented for age, and in no acute distress with a normal gait and normal affect who does not appear to be in any significant pain. Physical Exam:  Right Knee -Decrease range of motion with flexion, Knee arc of greater than 50 degrees, Some crepitation, Grossly neurovascularly intact, Good cap refill, No skin lesion, Moderate swelling, some gross instability, Some quadriceps weakness, Kellgren and Elijah at least grade 3    Left Knee - Full Range of Motion, No crepitation, Grossly neurovascularly intact, Good cap refill, No skin lesion, No swelling, No gross instability, No quadriceps weakness    Inpatient status: The patient has admitted to severe pain in the affected knee and due to such pain they are unable to complete activities of daily living at home and/or work on a regular basis where conservative treatments have failed. After extensive discussion with the patient, they have chosen to receive a total knee replacement with the expectation of inpatient procedure. Their dependent functional status (i.e. lack of capable support and safety at home, pain management, comorbities, or difficulty ambulating with assistive walking devices) would deem them a candidate for an inpatient stay. The patient acknowledges and understand the plan. The risks of surgery were explained to the patient which include but not limited to infection, nerve injury, artery injury, tendon injury, poor result, poor wound healing, unforeseen incidence, bleeding, infection, nerve damage, failure to improve, worsening of symptoms, morbidity, and mortality risks were explained. All questions were answered. Patient was told of no guarantees. Patient accepts all risks and benefits. A consent for surgery will be documented and signed by the patient or a legal guardian. All questions were answered. The procedure was explained in detail. The patient was counseled about the risks of jadon Covid-19 during their perioperative period and any recovery window from their procedure. The patient was made aware that jadon Covid-19 may worsen their prognosis for recovering from their procedure and lend to a higher morbidity and/or mortality risk. All material risks, benefits, and reasonable alternatives including postponing the procedure were discussed. The patient DOES wish to proceed with their procedure at this time. Encounter Diagnoses     ICD-10-CM ICD-9-CM   1. Right knee pain, unspecified chronicity  M25.561 719.46   2. Primary osteoarthritis of right knee  M17.11 715.16       HPI:  The patient is here with a chief complaint of right knee pain, throbbing, burning pain, progressively getting worse. Pain is 5/10. X-rays of the right knee are positive for severe OA. Assessment/Plan:  Plan will be for right total knee replacement. Percocet, Keflex, Zofran, and aspirin for DVT prophylaxis. We will use Cellerate because of her skin and no history of blood clots. She had a small clot in the hand from a fall, but not a true DVT; and she has been cleared. As part of continued conservative pain management options the patient was advised to utilize Tylenol or OTC NSAIDS as long as it is not medically contraindicated. Return to Office: Follow-up and Dispositions    Return for already scheduled for surgery. Scribed by Shelby Andersen LPN as dictated by [facility] RESPONSE [place] ROLAND Vaz MD.  Documentation True and Accepted Balwinder Vaz MD

## 2022-09-20 NOTE — LETTER
9/22/2022    Patient: Nataly Proctor   YOB: 1945   Date of Visit: 9/20/2022     William Carrington MD  [address] [facility] [address]  [address] [address]  Via Fax: [phone]    Dear William Carrington MD,      Thank you for referring Ms. Harjeet Whelan to [address] [place] [address] for evaluation. My notes for this consultation are attached. If you have questions, please do not hesitate to call me. I look forward to following your patient along with you.       Sincerely,    Angelika Doss MD

## 2022-09-20 NOTE — H&P (VIEW-ONLY)
Name: Rosina Mohr    : 1945     Service Dept: [address] [place] [address] [facility]    Chief Complaint   Patient presents with    Pre-op Exam    Knee Pain        There were no vitals taken for this visit. No Known Allergies     Current Outpatient Medications   Medication Sig Dispense Refill    buPROPion XL (WELLBUTRIN XL) 150 mg tablet       meloxicam (MOBIC) 15 mg tablet       metFORMIN ER (GLUCOPHAGE XR) 500 mg tablet       escitalopram oxalate (LEXAPRO) 10 mg tablet       escitalopram oxalate (LEXAPRO) 5 mg tablet       Estring 2 mg (7.5 mcg /24 hour) vaginal ring       DULoxetine (CYMBALTA) 30 mg capsule       Bydureon BCise 2 mg/0.85 mL atIn       levothyroxine (SYNTHROID) 100 mcg tablet       Xarelto 15 mg tab tablet       carvediloL (COREG) 3.125 mg tablet       exenatide microspheres (Bydureon) 2 mg/0.65 mL pnij 2 mg by SubCUTAneous route every seven (7) days. methylphenidate HCl (RITALIN) 20 mg tablet Take 20 mg by mouth three (3) times daily. ESTROGENS, CONJUGATED (PREMARIN PO) Take  by mouth daily. magnesium oxide (MAG-OX) 400 mg tablet Take 400 mg by mouth daily. cholecalciferol (VITAMIN D3) (1000 Units /25 mcg) tablet Take 1,000 Units by mouth daily. nebivoloL (BYSTOLIC) 5 mg tablet Take 5 mg by mouth daily. levothyroxine (SYNTHROID) 112 mcg tablet Take 0.1 mcg by mouth Daily (before breakfast). DULoxetine (CYMBALTA) 60 mg capsule Take 1 Cap by mouth daily. 30 Cap 5    esomeprazole (NEXIUM) 40 mg capsule Take  by mouth daily. pioglitazone (ACTOS) 15 mg tablet Take 30 mg by mouth.         Patient Active Problem List   Diagnosis Code    Myalgia and myositis, unspecified [id number]    Chronic low back pain M54.50, G89.29    Sacroiliitis, not elsewhere classified ([facility] M46.1    Enthesopathy of hip region M76.899    Pain in limb M79.609    Encounter for long-term (current) use of high-risk medication Z79.899    Fatigue R53.83 Hypothyroidism E03.9    Chronic pain syndrome G89.4    Neck pain M54.2    Low back pain M54.50      Family History   Problem Relation Age of Onset    Hypertension Other     Diabetes Other     Cancer Other     Cancer Mother     Cancer Sister     Cancer Paternal Grandmother     Cancer Brother       Social History     Socioeconomic History    Marital status:    Tobacco Use    Smoking status: Never    Smokeless tobacco: Never   Substance and Sexual Activity    Alcohol use: No    Drug use: No      Past Surgical History:   Procedure Laterality Date    COLONOSCOPY N/A 5/9/2017    COLONOSCOPY performed by Nicolasa Enriquez MD at [facility] ENDOSCOPY    COLONOSCOPY N/A 8/15/2022    COLONOSCOPY performed by Eleonora Urban MD at [address] [place] [address]    complete    HX KNEE ARTHROSCOPY Left 11/2016      Past Medical History:   Diagnosis Date    Adverse effect of anesthesia     woke up during hysterectomy. Placed back to sleep. 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 Caused by extreme stress    Hiatal hernia     HX OTHER MEDICAL     acute poor vision, double vision, need glasses due to [facility]. Hypertension     Hypothyroidism     Insomnia     Lyme disease 2008    Shingles 2008    Thromboembolus [facility]) 2021    Eliquis DC'sasha    Unspecified adverse effect of anesthesia     pt states she woke up during hysterectomy and put back to sleep.     Unspecified sleep apnea     not using cpap currently needs new mask        I have reviewed and agree with [address] and FELIPE and intake form in chart and the record furthermore I have reviewed prior medical record(s) regarding this patients care during this appointment. Review of Systems:   Patient is a pleasant appearing individual, appropriately dressed, well hydrated, well nourished, who is alert, appropriately oriented for age, and in no acute distress with a normal gait and normal affect who does not appear to be in any significant pain. Physical Exam:  Right Knee -Decrease range of motion with flexion, Knee arc of greater than 50 degrees, Some crepitation, Grossly neurovascularly intact, Good cap refill, No skin lesion, Moderate swelling, some gross instability, Some quadriceps weakness, Kellgren and Elijah at least grade 3    Left Knee - Full Range of Motion, No crepitation, Grossly neurovascularly intact, Good cap refill, No skin lesion, No swelling, No gross instability, No quadriceps weakness    Inpatient status: The patient has admitted to severe pain in the affected knee and due to such pain they are unable to complete activities of daily living at home and/or work on a regular basis where conservative treatments have failed. After extensive discussion with the patient, they have chosen to receive a total knee replacement with the expectation of inpatient procedure. Their dependent functional status (i.e. lack of capable support and safety at home, pain management, comorbities, or difficulty ambulating with assistive walking devices) would deem them a candidate for an inpatient stay. The patient acknowledges and understand the plan. The risks of surgery were explained to the patient which include but not limited to infection, nerve injury, artery injury, tendon injury, poor result, poor wound healing, unforeseen incidence, bleeding, infection, nerve damage, failure to improve, worsening of symptoms, morbidity, and mortality risks were explained. All questions were answered. Patient was told of no guarantees. Patient accepts all risks and benefits. A consent for surgery will be documented and signed by the patient or a legal guardian. All questions were answered. The procedure was explained in detail. The patient was counseled about the risks of jadon Covid-19 during their perioperative period and any recovery window from their procedure. The patient was made aware that jadon Covid-19 may worsen their prognosis for recovering from their procedure and lend to a higher morbidity and/or mortality risk. All material risks, benefits, and reasonable alternatives including postponing the procedure were discussed. The patient DOES wish to proceed with their procedure at this time. Encounter Diagnoses     ICD-10-CM ICD-9-CM   1. Right knee pain, unspecified chronicity  M25.561 719.46   2. Primary osteoarthritis of right knee  M17.11 715.16       HPI:  The patient is here with a chief complaint of right knee pain, throbbing, burning pain, progressively getting worse. Pain is 5/10. X-rays of the right knee are positive for severe OA. Assessment/Plan:  Plan will be for right total knee replacement. Percocet, Keflex, Zofran, and aspirin for DVT prophylaxis. We will use Cellerate because of her skin and no history of blood clots. She had a small clot in the hand from a fall, but not a true DVT; and she has been cleared. As part of continued conservative pain management options the patient was advised to utilize Tylenol or OTC NSAIDS as long as it is not medically contraindicated. Return to Office: Follow-up and Dispositions    Return for already scheduled for surgery. Scribed by Saman Cardoso LPN as dictated by [facility] RESPONSE [place] ROLAND Spencer MD.  Documentation True and Accepted Balwinder Spencer MD

## 2022-09-20 NOTE — PATIENT INSTRUCTIONS
Knee Arthritis: Care Instructions  Your Care Instructions     Knee arthritis is a breakdown of the cartilage that cushions your knee joint. When the cartilage wears down, your bones rub against each other. This causes pain and stiffness. Knee arthritis tends to get worse with time. Treatment for knee arthritis involves reducing pain, making the leg muscles stronger, and staying at a healthy body weight. The treatment usually does not improve the health of the cartilage, but it can reduce pain and improve how well your knee works. You can take simple measures to protect your knee joints, ease your pain, and help you stay active. Follow-up care is a key part of your treatment and safety. Be sure to make and go to all appointments, and call your doctor if you are having problems. It's also a good idea to know your test results and keep a list of the medicines you take. How can you care for yourself at home? Know that knee arthritis will cause more pain on some days than on others. Stay at a healthy weight. Lose weight if you are overweight. When you stand up, the pressure on your knees from every pound of body weight is multiplied four times. So if you lose 10 pounds, you will reduce the pressure on your knees by 40 pounds. Talk to your doctor or physical therapist about exercises that will help ease joint pain. Stretch to help prevent stiffness and to prevent injury before you exercise. You may enjoy gentle forms of yoga to help keep your knee joints and muscles flexible. Walk instead of jog. Ride a bike. This makes your thigh muscles stronger and takes pressure off your knee. Wear well-fitting and comfortable shoes. Exercise in chest-deep water. This can help you exercise longer with less pain. Avoid exercises that include squatting or kneeling. They can put a lot of strain on your knees. Talk to your doctor to make sure that the exercise you do is not making the arthritis worse.   Do not sit for long periods of time. Try to walk once in a while to keep your knee from getting stiff. Ask your doctor or physical therapist whether shoe inserts may reduce your arthritis pain. If you can afford it, get new athletic shoes at least every year. This can help reduce the strain on your knees. Use a device to help you do everyday activities. A cane or walking stick can help you keep your balance when you walk. Hold the cane or walking stick in the hand opposite the painful knee. If you feel like you may fall when you walk, try using crutches or a front-wheeled walker. These can prevent falls that could cause more damage to your knee. A knee brace may help keep your knee stable and prevent pain. You also can use other things to make life easier, such as a higher toilet seat and handrails in the bathtub or shower. Take pain medicines exactly as directed. Do not wait until you are in severe pain. You will get better results if you take it sooner. If you are not taking a prescription pain medicine, take an over-the-counter medicine such as acetaminophen (Tylenol), ibuprofen (Advil, Motrin), or naproxen (Aleve). Read and follow all instructions on the label. Do not take two or more pain medicines at the same time unless the doctor told you to. Many pain medicines have acetaminophen, which is Tylenol. Too much acetaminophen (Tylenol) can be harmful. Tell your doctor if you take a blood thinner, have diabetes, or have allergies to shellfish. Ask your doctor if you might benefit from a shot of steroid medicine into your knee. This may provide pain relief for several months. Many people take the supplements glucosamine and chondroitin for osteoarthritis. Some people feel they help, but the medical research does not show that they work. Talk to your doctor before you take these supplements. When should you call for help?    Call your doctor now or seek immediate medical care if:    You have sudden swelling, warmth, or pain in your knee. You have knee pain and a fever or rash. You have such bad pain that you cannot use your knee. Watch closely for changes in your health, and be sure to contact your doctor if you have any problems. Where can you learn more? Go to http://www.gray.com/  Enter W187 in the search box to learn more about \"Knee Arthritis: Care Instructions. \"  Current as of: December 20, 2021               Content Version: 13.2  © 2006-2022 [employer]. Care instructions adapted under license by [employer] (which disclaims liability or warranty for this information). If you have questions about a medical condition or this instruction, always ask your healthcare professional. [address] any warranty or liability for your use of this information.

## 2022-09-21 DIAGNOSIS — M17.11 OSTEOARTHRITIS OF RIGHT KNEE, UNSPECIFIED OSTEOARTHRITIS TYPE: Primary | ICD-10-CM

## 2022-09-21 DIAGNOSIS — Z96.651 STATUS POST TOTAL RIGHT KNEE REPLACEMENT: ICD-10-CM

## 2022-09-21 LAB
BACTERIA SPEC CULT: ABNORMAL
BACTERIA SPEC CULT: ABNORMAL
SPECIAL REQUESTS,SREQ: ABNORMAL

## 2022-09-22 ENCOUNTER — TELEPHONE (OUTPATIENT)
Dept: ORTHOPEDIC SURGERY | Age: 77
End: 2022-09-22

## 2022-09-22 DIAGNOSIS — M17.11 PRIMARY OSTEOARTHRITIS OF RIGHT KNEE: ICD-10-CM

## 2022-09-22 RX ORDER — OXYCODONE AND ACETAMINOPHEN 5; 325 MG/1; MG/1
1 TABLET ORAL
Qty: 30 TABLET | Refills: 0 | Status: SHIPPED | OUTPATIENT
Start: 2022-09-22 | End: 2022-09-30 | Stop reason: ALTCHOICE

## 2022-09-23 ENCOUNTER — ANESTHESIA (OUTPATIENT)
Dept: SURGERY | Age: 77
End: 2022-09-23
Payer: MEDICARE

## 2022-09-23 ENCOUNTER — APPOINTMENT (OUTPATIENT)
Dept: GENERAL RADIOLOGY | Age: 77
End: 2022-09-23
Attending: NURSE PRACTITIONER
Payer: MEDICARE

## 2022-09-23 ENCOUNTER — HOSPITAL ENCOUNTER (OUTPATIENT)
Age: 77
Setting detail: OBSERVATION
Discharge: HOME | End: 2022-09-24
Attending: ORTHOPAEDIC SURGERY | Admitting: INTERNAL MEDICINE
Payer: MEDICARE

## 2022-09-23 PROBLEM — I10 PRIMARY HYPERTENSION: Status: ACTIVE | Noted: 2022-09-23

## 2022-09-23 PROBLEM — Z96.651 S/P TOTAL KNEE ARTHROPLASTY, RIGHT: Status: ACTIVE | Noted: 2022-09-23

## 2022-09-23 PROBLEM — M17.9 OA (OSTEOARTHRITIS) OF KNEE: Status: ACTIVE | Noted: 2022-09-23

## 2022-09-23 LAB
GLUCOSE BLD STRIP.AUTO-MCNC: 137 MG/DL (ref 70–110)
GLUCOSE BLD STRIP.AUTO-MCNC: 269 MG/DL (ref 70–110)
GLUCOSE BLD STRIP.AUTO-MCNC: 94 MG/DL (ref 70–110)
PERFORMED BY, TECHID: ABNORMAL
PERFORMED BY, TECHID: ABNORMAL
PERFORMED BY, TECHID: NORMAL

## 2022-09-23 PROCEDURE — 97161 PT EVAL LOW COMPLEX 20 MIN: CPT

## 2022-09-23 PROCEDURE — 2709999900 HC NON-CHARGEABLE SUPPLY: Performed by: ORTHOPAEDIC SURGERY

## 2022-09-23 PROCEDURE — 77030000032 HC CUF TRNQT ZIMM -B: Performed by: ORTHOPAEDIC SURGERY

## 2022-09-23 PROCEDURE — 76060000034 HC ANESTHESIA 1.5 TO 2 HR: Performed by: ORTHOPAEDIC SURGERY

## 2022-09-23 PROCEDURE — 82962 GLUCOSE BLOOD TEST: CPT

## 2022-09-23 PROCEDURE — 74011000250 HC RX REV CODE- 250: Performed by: ORTHOPAEDIC SURGERY

## 2022-09-23 PROCEDURE — C1776 JOINT DEVICE (IMPLANTABLE): HCPCS | Performed by: ORTHOPAEDIC SURGERY

## 2022-09-23 PROCEDURE — 74011250637 HC RX REV CODE- 250/637: Performed by: NURSE ANESTHETIST, CERTIFIED REGISTERED

## 2022-09-23 PROCEDURE — 97116 GAIT TRAINING THERAPY: CPT

## 2022-09-23 PROCEDURE — 74011000258 HC RX REV CODE- 258: Performed by: NURSE ANESTHETIST, CERTIFIED REGISTERED

## 2022-09-23 PROCEDURE — 77030013708 HC HNDPC SUC IRR PULS STRY –B: Performed by: ORTHOPAEDIC SURGERY

## 2022-09-23 PROCEDURE — 77030029372 HC ADH SKN CLSR PRINEO J&J -C: Performed by: ORTHOPAEDIC SURGERY

## 2022-09-23 PROCEDURE — 77030040361 HC SLV COMPR DVT MDII -B: Performed by: ORTHOPAEDIC SURGERY

## 2022-09-23 PROCEDURE — G0378 HOSPITAL OBSERVATION PER HR: HCPCS

## 2022-09-23 PROCEDURE — 76942 ECHO GUIDE FOR BIOPSY: CPT | Performed by: NURSE ANESTHETIST, CERTIFIED REGISTERED

## 2022-09-23 PROCEDURE — 77030006835 HC BLD SAW SAG STRY -B: Performed by: ORTHOPAEDIC SURGERY

## 2022-09-23 PROCEDURE — 74011250637 HC RX REV CODE- 250/637: Performed by: NURSE PRACTITIONER

## 2022-09-23 PROCEDURE — 74011000272 HC RX REV CODE- 272: Performed by: ORTHOPAEDIC SURGERY

## 2022-09-23 PROCEDURE — 77030003601 HC NDL NRV BLK BBMI -A: Performed by: NURSE ANESTHETIST, CERTIFIED REGISTERED

## 2022-09-23 PROCEDURE — 74011636637 HC RX REV CODE- 636/637: Performed by: NURSE PRACTITIONER

## 2022-09-23 PROCEDURE — 74011250636 HC RX REV CODE- 250/636: Performed by: NURSE ANESTHETIST, CERTIFIED REGISTERED

## 2022-09-23 PROCEDURE — 94761 N-INVAS EAR/PLS OXIMETRY MLT: CPT

## 2022-09-23 PROCEDURE — 77030018673: Performed by: ORTHOPAEDIC SURGERY

## 2022-09-23 PROCEDURE — 76210000063 HC OR PH I REC FIRST 0.5 HR: Performed by: ORTHOPAEDIC SURGERY

## 2022-09-23 PROCEDURE — 74011000250 HC RX REV CODE- 250: Performed by: NURSE PRACTITIONER

## 2022-09-23 PROCEDURE — 77030041690 HC SYS PINNING KN JNJ -D: Performed by: ORTHOPAEDIC SURGERY

## 2022-09-23 PROCEDURE — 77030011266 HC ELECTRD BLD INSL COVD -A: Performed by: ORTHOPAEDIC SURGERY

## 2022-09-23 PROCEDURE — 64447 NJX AA&/STRD FEMORAL NRV IMG: CPT | Performed by: NURSE ANESTHETIST, CERTIFIED REGISTERED

## 2022-09-23 PROCEDURE — 77030007866 HC KT SPN ANES BBMI -B: Performed by: NURSE ANESTHETIST, CERTIFIED REGISTERED

## 2022-09-23 PROCEDURE — 77030006812 HC BLD SAW RECIP STRY -B: Performed by: ORTHOPAEDIC SURGERY

## 2022-09-23 PROCEDURE — 76010000153 HC OR TIME 1.5 TO 2 HR: Performed by: ORTHOPAEDIC SURGERY

## 2022-09-23 PROCEDURE — 73560 X-RAY EXAM OF KNEE 1 OR 2: CPT

## 2022-09-23 PROCEDURE — 77030031139 HC SUT VCRL2 J&J -A: Performed by: ORTHOPAEDIC SURGERY

## 2022-09-23 PROCEDURE — 77010033678 HC OXYGEN DAILY

## 2022-09-23 PROCEDURE — 77030039147 HC PWDR HEMSTS SURGICEL JNJ -D: Performed by: ORTHOPAEDIC SURGERY

## 2022-09-23 PROCEDURE — 77030031140 HC SUT VCRL3 J&J -A: Performed by: ORTHOPAEDIC SURGERY

## 2022-09-23 PROCEDURE — 77030013079 HC BLNKT BAIR HGGR 3M -A: Performed by: NURSE ANESTHETIST, CERTIFIED REGISTERED

## 2022-09-23 PROCEDURE — 64450 NJX AA&/STRD OTHER PN/BRANCH: CPT | Performed by: NURSE ANESTHETIST, CERTIFIED REGISTERED

## 2022-09-23 PROCEDURE — 74011250636 HC RX REV CODE- 250/636: Performed by: NURSE PRACTITIONER

## 2022-09-23 PROCEDURE — C1713 ANCHOR/SCREW BN/BN,TIS/BN: HCPCS | Performed by: ORTHOPAEDIC SURGERY

## 2022-09-23 PROCEDURE — 77030038692 HC WND DEB SYS IRMX -B: Performed by: ORTHOPAEDIC SURGERY

## 2022-09-23 PROCEDURE — 74011000250 HC RX REV CODE- 250: Performed by: NURSE ANESTHETIST, CERTIFIED REGISTERED

## 2022-09-23 PROCEDURE — 77030040393 HC DRSG OPTIFOAM GENT MDII -B: Performed by: ORTHOPAEDIC SURGERY

## 2022-09-23 DEVICE — ATTUNE KNEE SYSTEM FEMORAL POSTERIOR STABILIZED NARROW SIZE 4N RIGHT CEMENTED
Type: IMPLANTABLE DEVICE | Site: KNEE | Status: FUNCTIONAL
Brand: ATTUNE

## 2022-09-23 DEVICE — ATTUNE KNEE SYSTEM TIBIAL INSERT ROTATING PLATFORM POSTERIOR STABILIZED 4 5MM AOX
Type: IMPLANTABLE DEVICE | Site: KNEE | Status: FUNCTIONAL
Brand: ATTUNE

## 2022-09-23 DEVICE — ATTUNE KNEE SYSTEM TIBIAL BASE ROTATING PLATFORM SIZE 4 CEMENTED
Type: IMPLANTABLE DEVICE | Site: KNEE | Status: FUNCTIONAL
Brand: ATTUNE

## 2022-09-23 DEVICE — KNEE K1 TOT HEMI STD CEM IMPL CAPPED SYNTHES: Type: IMPLANTABLE DEVICE | Site: KNEE | Status: FUNCTIONAL

## 2022-09-23 DEVICE — CEMENT BNE GENTAMC HV R+G 40GM -- PALACOS R+G 5036964: Type: IMPLANTABLE DEVICE | Site: KNEE | Status: FUNCTIONAL

## 2022-09-23 RX ORDER — FACIAL-BODY WIPES
10 EACH TOPICAL DAILY PRN
Status: DISCONTINUED | OUTPATIENT
Start: 2022-09-23 | End: 2022-09-24 | Stop reason: HOSPADM

## 2022-09-23 RX ORDER — LEVOTHYROXINE SODIUM 100 UG/1
100 TABLET ORAL
Status: DISCONTINUED | OUTPATIENT
Start: 2022-09-24 | End: 2022-09-24 | Stop reason: HOSPADM

## 2022-09-23 RX ORDER — NALOXONE HYDROCHLORIDE 0.4 MG/ML
0.4 INJECTION, SOLUTION INTRAMUSCULAR; INTRAVENOUS; SUBCUTANEOUS AS NEEDED
Status: DISCONTINUED | OUTPATIENT
Start: 2022-09-23 | End: 2022-09-24 | Stop reason: HOSPADM

## 2022-09-23 RX ORDER — CARVEDILOL 3.12 MG/1
3.12 TABLET ORAL 2 TIMES DAILY WITH MEALS
Status: DISCONTINUED | OUTPATIENT
Start: 2022-09-23 | End: 2022-09-24 | Stop reason: HOSPADM

## 2022-09-23 RX ORDER — OXYCODONE AND ACETAMINOPHEN 10; 325 MG/1; MG/1
1 TABLET ORAL
Status: DISCONTINUED | OUTPATIENT
Start: 2022-09-23 | End: 2022-09-24 | Stop reason: HOSPADM

## 2022-09-23 RX ORDER — SODIUM CHLORIDE, SODIUM LACTATE, POTASSIUM CHLORIDE, CALCIUM CHLORIDE 600; 310; 30; 20 MG/100ML; MG/100ML; MG/100ML; MG/100ML
25 INJECTION, SOLUTION INTRAVENOUS CONTINUOUS
Status: DISCONTINUED | OUTPATIENT
Start: 2022-09-23 | End: 2022-09-23 | Stop reason: HOSPADM

## 2022-09-23 RX ORDER — SODIUM CHLORIDE 0.9 % (FLUSH) 0.9 %
5-40 SYRINGE (ML) INJECTION EVERY 8 HOURS
Status: DISCONTINUED | OUTPATIENT
Start: 2022-09-23 | End: 2022-09-23 | Stop reason: HOSPADM

## 2022-09-23 RX ORDER — TRANEXAMIC ACID 100 MG/ML
INJECTION, SOLUTION INTRAVENOUS AS NEEDED
Status: DISCONTINUED | OUTPATIENT
Start: 2022-09-23 | End: 2022-09-23 | Stop reason: HOSPADM

## 2022-09-23 RX ORDER — PANTOPRAZOLE SODIUM 40 MG/1
40 TABLET, DELAYED RELEASE ORAL
Status: DISCONTINUED | OUTPATIENT
Start: 2022-09-24 | End: 2022-09-24 | Stop reason: HOSPADM

## 2022-09-23 RX ORDER — SODIUM CHLORIDE 0.9 % (FLUSH) 0.9 %
5-40 SYRINGE (ML) INJECTION EVERY 8 HOURS
Status: DISCONTINUED | OUTPATIENT
Start: 2022-09-23 | End: 2022-09-24 | Stop reason: HOSPADM

## 2022-09-23 RX ORDER — ONDANSETRON 2 MG/ML
4 INJECTION INTRAMUSCULAR; INTRAVENOUS ONCE
Status: DISCONTINUED | OUTPATIENT
Start: 2022-09-23 | End: 2022-09-23 | Stop reason: HOSPADM

## 2022-09-23 RX ORDER — SODIUM CHLORIDE, SODIUM LACTATE, POTASSIUM CHLORIDE, CALCIUM CHLORIDE 600; 310; 30; 20 MG/100ML; MG/100ML; MG/100ML; MG/100ML
INJECTION, SOLUTION INTRAVENOUS
Status: DISCONTINUED | OUTPATIENT
Start: 2022-09-23 | End: 2022-09-23 | Stop reason: HOSPADM

## 2022-09-23 RX ORDER — SODIUM CHLORIDE 0.9 % (FLUSH) 0.9 %
5-40 SYRINGE (ML) INJECTION AS NEEDED
Status: DISCONTINUED | OUTPATIENT
Start: 2022-09-23 | End: 2022-09-24 | Stop reason: HOSPADM

## 2022-09-23 RX ORDER — BUPIVACAINE HYDROCHLORIDE 7.5 MG/ML
INJECTION, SOLUTION INTRASPINAL
Status: SHIPPED | OUTPATIENT
Start: 2022-09-23 | End: 2022-09-23

## 2022-09-23 RX ORDER — ONDANSETRON 2 MG/ML
4 INJECTION INTRAMUSCULAR; INTRAVENOUS
Status: DISCONTINUED | OUTPATIENT
Start: 2022-09-23 | End: 2022-09-24 | Stop reason: HOSPADM

## 2022-09-23 RX ORDER — GUAIFENESIN 100 MG/5ML
81 LIQUID (ML) ORAL DAILY
Status: DISCONTINUED | OUTPATIENT
Start: 2022-09-24 | End: 2022-09-24 | Stop reason: HOSPADM

## 2022-09-23 RX ORDER — SENNOSIDES 8.6 MG/1
1 TABLET ORAL 2 TIMES DAILY
Status: DISCONTINUED | OUTPATIENT
Start: 2022-09-23 | End: 2022-09-24 | Stop reason: HOSPADM

## 2022-09-23 RX ORDER — ACETAMINOPHEN 325 MG/1
650 TABLET ORAL
Status: DISCONTINUED | OUTPATIENT
Start: 2022-09-23 | End: 2022-09-24 | Stop reason: HOSPADM

## 2022-09-23 RX ORDER — LIDOCAINE HYDROCHLORIDE 10 MG/ML
INJECTION, SOLUTION EPIDURAL; INFILTRATION; INTRACAUDAL; PERINEURAL
Status: SHIPPED | OUTPATIENT
Start: 2022-09-23 | End: 2022-09-23

## 2022-09-23 RX ORDER — SODIUM CHLORIDE 0.9 % (FLUSH) 0.9 %
5-40 SYRINGE (ML) INJECTION AS NEEDED
Status: DISCONTINUED | OUTPATIENT
Start: 2022-09-23 | End: 2022-09-23 | Stop reason: HOSPADM

## 2022-09-23 RX ORDER — INSULIN LISPRO 100 [IU]/ML
INJECTION, SOLUTION INTRAVENOUS; SUBCUTANEOUS ONCE
Status: DISCONTINUED | OUTPATIENT
Start: 2022-09-23 | End: 2022-09-23 | Stop reason: HOSPADM

## 2022-09-23 RX ORDER — DIPHENHYDRAMINE HYDROCHLORIDE 50 MG/ML
12.5 INJECTION, SOLUTION INTRAMUSCULAR; INTRAVENOUS
Status: DISCONTINUED | OUTPATIENT
Start: 2022-09-23 | End: 2022-09-24 | Stop reason: HOSPADM

## 2022-09-23 RX ORDER — KETOROLAC TROMETHAMINE 30 MG/ML
15 INJECTION, SOLUTION INTRAMUSCULAR; INTRAVENOUS
Status: DISCONTINUED | OUTPATIENT
Start: 2022-09-23 | End: 2022-09-24 | Stop reason: HOSPADM

## 2022-09-23 RX ORDER — BUPIVACAINE HYDROCHLORIDE 5 MG/ML
INJECTION, SOLUTION EPIDURAL; INTRACAUDAL
Status: SHIPPED | OUTPATIENT
Start: 2022-09-23 | End: 2022-09-23

## 2022-09-23 RX ORDER — ACETAMINOPHEN 500 MG
1000 TABLET ORAL ONCE
Status: COMPLETED | OUTPATIENT
Start: 2022-09-23 | End: 2022-09-23

## 2022-09-23 RX ORDER — FENTANYL CITRATE 50 UG/ML
50 INJECTION, SOLUTION INTRAMUSCULAR; INTRAVENOUS AS NEEDED
Status: DISCONTINUED | OUTPATIENT
Start: 2022-09-23 | End: 2022-09-23 | Stop reason: HOSPADM

## 2022-09-23 RX ORDER — GABAPENTIN 300 MG/1
300 CAPSULE ORAL ONCE
Status: COMPLETED | OUTPATIENT
Start: 2022-09-23 | End: 2022-09-23

## 2022-09-23 RX ORDER — MIDAZOLAM HYDROCHLORIDE 1 MG/ML
INJECTION, SOLUTION INTRAMUSCULAR; INTRAVENOUS
Status: SHIPPED | OUTPATIENT
Start: 2022-09-23 | End: 2022-09-23

## 2022-09-23 RX ORDER — OXYCODONE AND ACETAMINOPHEN 5; 325 MG/1; MG/1
2 TABLET ORAL
Status: DISCONTINUED | OUTPATIENT
Start: 2022-09-23 | End: 2022-09-24 | Stop reason: HOSPADM

## 2022-09-23 RX ORDER — INSULIN LISPRO 100 [IU]/ML
INJECTION, SOLUTION INTRAVENOUS; SUBCUTANEOUS
Status: DISCONTINUED | OUTPATIENT
Start: 2022-09-23 | End: 2022-09-24 | Stop reason: HOSPADM

## 2022-09-23 RX ORDER — MAGNESIUM SULFATE 100 %
4 CRYSTALS MISCELLANEOUS AS NEEDED
Status: DISCONTINUED | OUTPATIENT
Start: 2022-09-23 | End: 2022-09-24 | Stop reason: HOSPADM

## 2022-09-23 RX ORDER — DEXAMETHASONE SODIUM PHOSPHATE 4 MG/ML
INJECTION, SOLUTION INTRA-ARTICULAR; INTRALESIONAL; INTRAMUSCULAR; INTRAVENOUS; SOFT TISSUE
Status: SHIPPED | OUTPATIENT
Start: 2022-09-23 | End: 2022-09-23

## 2022-09-23 RX ORDER — CEFAZOLIN SODIUM 1 G/3ML
INJECTION, POWDER, FOR SOLUTION INTRAMUSCULAR; INTRAVENOUS AS NEEDED
Status: DISCONTINUED | OUTPATIENT
Start: 2022-09-23 | End: 2022-09-23 | Stop reason: HOSPADM

## 2022-09-23 RX ORDER — BUPIVACAINE HYDROCHLORIDE 2.5 MG/ML
INJECTION, SOLUTION EPIDURAL; INFILTRATION; INTRACAUDAL AS NEEDED
Status: DISCONTINUED | OUTPATIENT
Start: 2022-09-23 | End: 2022-09-23 | Stop reason: HOSPADM

## 2022-09-23 RX ORDER — PROPOFOL 10 MG/ML
INJECTION, EMULSION INTRAVENOUS
Status: DISCONTINUED | OUTPATIENT
Start: 2022-09-23 | End: 2022-09-23 | Stop reason: HOSPADM

## 2022-09-23 RX ORDER — DULOXETIN HYDROCHLORIDE 30 MG/1
60 CAPSULE, DELAYED RELEASE ORAL DAILY
Status: DISCONTINUED | OUTPATIENT
Start: 2022-09-24 | End: 2022-09-24 | Stop reason: HOSPADM

## 2022-09-23 RX ADMIN — PROPOFOL 20 MCG/KG/MIN: 10 INJECTION, EMULSION INTRAVENOUS at 11:07

## 2022-09-23 RX ADMIN — GABAPENTIN 300 MG: 300 CAPSULE ORAL at 07:56

## 2022-09-23 RX ADMIN — TRANEXAMIC ACID 1 G: 1 INJECTION, SOLUTION INTRAVENOUS at 11:07

## 2022-09-23 RX ADMIN — OXYCODONE AND ACETAMINOPHEN 1 TABLET: 10; 325 TABLET ORAL at 17:24

## 2022-09-23 RX ADMIN — SODIUM CHLORIDE, POTASSIUM CHLORIDE, SODIUM LACTATE AND CALCIUM CHLORIDE: 600; 310; 30; 20 INJECTION, SOLUTION INTRAVENOUS at 10:50

## 2022-09-23 RX ADMIN — BUPIVACAINE HYDROCHLORIDE WITH DEXTROSE 13.5 MG: 7.5 INJECTION SUBARACHNOID at 10:57

## 2022-09-23 RX ADMIN — CEFAZOLIN SODIUM 2 G: 1 INJECTION, POWDER, FOR SOLUTION INTRAMUSCULAR; INTRAVENOUS at 11:07

## 2022-09-23 RX ADMIN — BUPIVACAINE HYDROCHLORIDE 20 ML: 5 INJECTION, SOLUTION EPIDURAL; INTRACAUDAL; PERINEURAL at 10:41

## 2022-09-23 RX ADMIN — SODIUM CHLORIDE, PRESERVATIVE FREE 10 ML: 5 INJECTION INTRAVENOUS at 21:18

## 2022-09-23 RX ADMIN — PHENYLEPHRINE HYDROCHLORIDE 100 MCG: 10 INJECTION INTRAVENOUS at 11:34

## 2022-09-23 RX ADMIN — INSULIN LISPRO 6 UNITS: 100 INJECTION, SOLUTION INTRAVENOUS; SUBCUTANEOUS at 21:18

## 2022-09-23 RX ADMIN — KETOROLAC TROMETHAMINE 15 MG: 30 INJECTION, SOLUTION INTRAMUSCULAR at 19:21

## 2022-09-23 RX ADMIN — SODIUM CHLORIDE, PRESERVATIVE FREE 10 ML: 5 INJECTION INTRAVENOUS at 17:36

## 2022-09-23 RX ADMIN — SODIUM CHLORIDE, POTASSIUM CHLORIDE, SODIUM LACTATE AND CALCIUM CHLORIDE 25 ML/HR: 600; 310; 30; 20 INJECTION, SOLUTION INTRAVENOUS at 07:57

## 2022-09-23 RX ADMIN — MIDAZOLAM HYDROCHLORIDE 4 MG: 2 INJECTION, SOLUTION INTRAMUSCULAR; INTRAVENOUS at 10:41

## 2022-09-23 RX ADMIN — LIDOCAINE HYDROCHLORIDE 30 MG: 10 INJECTION, SOLUTION EPIDURAL; INFILTRATION; INTRACAUDAL; PERINEURAL at 10:57

## 2022-09-23 RX ADMIN — DEXAMETHASONE SODIUM PHOSPHATE 4 MG: 4 INJECTION, SOLUTION INTRAMUSCULAR; INTRAVENOUS at 10:41

## 2022-09-23 RX ADMIN — ACETAMINOPHEN 1000 MG: 500 TABLET ORAL at 07:55

## 2022-09-23 RX ADMIN — OXYCODONE AND ACETAMINOPHEN 2 TABLET: 5; 325 TABLET ORAL at 21:18

## 2022-09-23 NOTE — PROGRESS NOTES
Problem: Falls - Risk of  Goal: *Absence of Falls  Description: Document Mykel Kerns Fall Risk and appropriate interventions in the flowsheet.   Outcome: Progressing Towards Goal  Note: Fall Risk Interventions:  Mobility Interventions: Utilize walker, cane, or other assistive device

## 2022-09-23 NOTE — PERIOP NOTES
TRANSFER - OUT REPORT:    Verbal report given to Bronwen Merlin, LPN(name) on Rayo Larry  being transferred to [facility](unit) for routine post - op       Report consisted of patients Situation, Background, Assessment and   Recommendations(SBAR). Information from the following report(s) SBAR, OR Summary, Intake/Output, and MAR was reviewed with the receiving nurse. Lines:   Peripheral IV 09/23/22 Left;Posterior Hand (Active)   Site Assessment Clean, dry, & intact 09/23/22 1226   Phlebitis Assessment 0 09/23/22 1226   Infiltration Assessment 0 09/23/22 1226   Dressing Status Clean, dry, & intact 09/23/22 1226   Dressing Type Transparent 09/23/22 1226   Hub Color/Line Status Pink;Patent 09/23/22 1226   Alcohol Cap Used No 09/23/22 1226        Opportunity for questions and clarification was provided.       Patient transported with:   Registered Nurse    Visit Vitals  /63 (BP 1 Location: Left upper arm, BP Patient Position: At rest;Reclining)   Pulse 81   Temp 97.7 °F (36.5 °C)   Resp 16   Ht 5' 2\" (1.575 m)   Wt 76.2 kg (168 lb 1.6 oz)   SpO2 100%   BMI 30.75 kg/m²

## 2022-09-23 NOTE — INTERVAL H&P NOTE
Update History & Physical    The Patient's History and Physical was reviewed with the patient. The patient was examined. There was no change. The surgical site was confirmed by the patient and me. Patient understands and wants to proceed with the procedure. If applicable, I have discussed with the patient / power of  the rationale for blood component transfusion; its benefits in treating or preventing fatigue, organ damage, or death; and its risk which includes mild transfusion reactions, rare risk of blood borne infection, or more serious but rare reactions. I have discussed the alternatives to transfusion, including the risk and consequences of not receiving transfusion. The patient / Andrea Gins of  had an opportunity to ask questions and had agreed to proceed with transfusion of blood components. Plan:  The risk, benefits, expected outcome, and alternative to the recommended procedure have been discussed with the patient.       Electronically signed by Paul Ferro on 9/23/2022 at 8:11 AM

## 2022-09-23 NOTE — OP NOTES
Operative Note    Patient: Yaron Painting MRN: [id number]  Surgery Date: 9/23/2022  [unfilled]          Procedure  Primary Surgeon    Right TKA  Monserrat Galvan MD    * Panel 2 does not exist *  * Panel 2 does not exist *    * Panel 3 does not exist *  * Panel 3 does not exist *     Surgeon(s) and Role:     * Monserrat Galvan MD - Primary    Other OR Staff/Assistants:  Circ-1: Patrizia Kurtz RN  Scrub Tech-1: Candice Sanders  Scrub Tech-2: Rip Forth  Surg Asst-1: Madan flanagan Assistant Tasks:  Closing    Pre-operative Diagnosis:  Osteoarthritis of right knee, unspecified osteoarthritis type [M17.11]    Post-operative Diagnosis: same as preop diagnosis    Anesthesia Type: Spinal     Findings: djd    Complications: No    EBL: 50 cc    Body mass index is 30.75 kg/m².     Specimens: None    Implants       Type Not Specified    Cement Bne Gentamc Hv R+G 40gm -- Palacos R+G [id number] - [id number] - Implanted   (Right) Knee      Inventory item: CEMENT [facility] HV R+G 40GM -- PALACOS R+G [id number] Model/Cat number: [id number]    : [employer] Lot number: [id number]      As of 9/23/2022       Status: Implanted                      Cement Bne Gentamc Hv R+G 40gm -- Palacos R+G [id number] - [id number] - Implanted   (Right) Knee      Inventory item: CEMENT [facility] HV R+G 40GM -- PALACOS R+G [id number] Model/Cat number: [id number]    : [employer] Lot number: [id number]      As of 9/23/2022       Status: Implanted                      Insert Tib Sz 4 Thk5mm Knee Post Stbl Rot Platfrm Attune - [id number] - Implanted   (Right) Knee      Inventory item: INSERT TIB SZ 4 THK5MM KNEE POST STBL ROT PLATFRM ATTUNE Model/Cat number: [id number]    : [employer] Lot number: [id number]    Device identifier: [id number] Device identifier type: GS1      GUDID Information       Request status Successful        Brand name: ATTUNE Version/Model: 7436- Company name: [employer]) MRI safety info as of 9/23/22: Labeling does not contain MRI Safety Information    Contains dry or latex rubber: No      GMDN P.T. name: Tibial insert                As of 9/23/2022       Status: Implanted                      Component Fem Sz 4 R Knee Colleen Post Stbl Delmi Attune - [id number] - Implanted   (Right) Knee      Inventory item: COMPONENT FEM SZ 4 R KNEE COLLEEN POST STBL DELMI ATTUNE Model/Cat number: [id number]    : [employer] [place] [employer] [place] Lot number: [id number]    Device identifier: [id number] Device identifier type: GS1      GUDID Information       Request status Successful        Brand name: TwoFish Version/Model: 1504-    Company name: [employer]) MRI safety info as of 9/23/22: Labeling does not contain MRI Safety Information    Contains dry or latex rubber: No      GMDN P.T. name: Uncoated knee femur prosthesis, metallic                As of 9/23/2022       Status: Implanted                      Baseplate Tib Sz 4 Knee Rot Platfrm Delmi Sys Attune - [id number] - Implanted   (Right) Knee      Inventory item: BASEPLATE TIB SZ 4 KNEE ROT PLATFRM DELMI SYS ATTUNE Model/Cat number: [id number]    : [employer] [place] [employer] [place] Lot number: [id number]    Device identifier: [id number] Device identifier type: GS1      GUDID Information       Request status Successful        Brand name: TwoFish Version/Model: 1506-    Company name: Dominique Koenig [employer]) MRI safety info as of 9/23/22: Labeling does not contain MRI Safety Information    Contains dry or latex rubber: No      GMDN P.T. name: Uncoated knee tibia prosthesis, metallic                As of 9/23/2022       Status: Implanted                               Operative procedure: Total knee replacement    OPERATIVE PROCEDURE:  Please note the first assistant role was to help in patient positioning and draping of the extremity in a sterile fashion.  Also during the surgery the assistant's responsibilities included but not limited to extremity positioning during critical portions of the surgery. Assisting in using and placement of retractors during surgery. Lower extremity was prepped and draped in a sterile fashion. After adequate anesthesia was given, the patient was placed in a well-padded supine position. Subvastus arthrotomy from the tibial tubercle to the superior pole of the patella was made. Knee was hyperflexed. Intramedullary reaming of distal femur and proximal tibia was performed. 10 mm of distal femur was cut. Anterior-posterior sizing guide was used. Anterior, posterior, chamfer cuts, and box cuts were made next. Proximal tibial cut and preparation performed. Posterior osteophyte meniscal remnants were removed, and also patella was everted. Free-hand cut of the patella was made. Trial components were placed. The patient was found to have excellent range of motion and stability with all trial components. All the trial components removed. Copious irrigation performed. Distal femur, proximal tibia, and patella were impacted in place. Excessive cement was removed. After the cement was hard, Subvastus arthrotomy closed with Vicryl stitch. Compressive dressing was applied. The patient was taken to PACU in stable condition. Please note due to the patient's BMI of greater than 30 significant surgical effort was required compared to the standard patient with a BMI lower than 30. Surgical time increased approximately 30% from the normal surgical time due to the patient's high BMI. Because of the high BMI patient's knee would be considered a complex total knee replacement rather than a standard total knee replacement.       Meryle Simons, MD

## 2022-09-23 NOTE — ANESTHESIA PROCEDURE NOTES
Spinal Block    Start time: 9/23/2022 10:50 AM  End time: 9/23/2022 10:58 AM  Performed by: Constantino Menendez CRNA  Authorized by: Constantino Menendez CRNA     Pre-procedure: Indications: at surgeon's request and primary anesthetic  Preanesthetic Checklist: patient identified, risks and benefits discussed, anesthesia consent, site marked, patient being monitored, timeout performed and fire risk safety assessment completed and verbalized    Timeout Time: 10:50 EDT Esperanza LUNA      Spinal Block:   Patient Position:  Seated  Prep Region:  Lumbar  Prep: Betadine      Location:  L2-3  Technique:  Single shot  Local: lidocaine (PF) (XYLOCAINE) 10 mg/mL (1 %) IntraDERMAL - IntraDERMal   30 mg - 9/23/2022 10:57:00 AM  bupivacaine 0.75% in dextrose 8.25% preserv-free (SENSORCAINE) Intrathecal - Intrathecal   13.5 mg - 9/23/2022 10:57:00 AM  Local Dose (mL):  1.8  Med Admin Time: 9/23/2022 10:57 AM    Needle:   Needle Type:   Latha  Needle Gauge:  24 G  Attempts:  1      Events: CSF confirmed, no blood with aspiration and no paresthesia        Assessment:  Insertion:  Uncomplicated  Patient tolerance:  Patient tolerated the procedure well with no immediate complications

## 2022-09-23 NOTE — H&P
History and Physical    Subjective: Pallavi Lee is a 69 y.o. with a past medical history for Hypertension, diabetes Mellitus Type 2, Depression, Osteoarthritis, Gullian -Continental, and Hypothyroidism, patient presented to the facility today to undergo a right total knee arthroplasty with Dr. Watson Staff. Hospital medicine consulted due to patient with multiple chronic diagnosis for medication management and overnight observation. Patient assessed at the bedside, patient is alert and oriented x3, there are no acute signs and symptoms of distress. Patient denies chest pain, shortness of breath, nausea, vomiting, diarrhea. Patient agrees to admission for overnight observation, with plans to discharge home in the morning. Admit to medical surgical unit. Past Medical History:   Diagnosis Date    Adverse effect of anesthesia     woke up during hysterectomy. Placed back to sleep. Chronic fatigue syndrome 2008    Onset by Tanika Bean    Chronic pain     lower back    Depression     1  time per patient no meds    Diabetes ([facility] [address]     Type 2    Fibromyalgia 2008    onset by Tanika Bean    Fracture 09/2012    right wrist    Fracture of right wrist 2011    no surgery    GERD (gastroesophageal reflux disease)     Guillain-Continental syndrome ([facility] [address] 2008    could not see or walk, lost weight, could not write, no respiratory [facility] [place]). Caused by extreme stress    Hiatal hernia     HX OTHER MEDICAL     acute poor vision, double vision, need glasses due to [facility]. Hypertension     Hypothyroidism     Insomnia     Lyme disease 2008    Shingles 2008    Thromboembolus [facility]) 2021    Eliquis DC'sasha    Unspecified adverse effect of anesthesia     pt states she woke up during hysterectomy and put back to sleep.     Unspecified sleep apnea     not using cpap currently needs new mask      Past Surgical History:   Procedure Laterality Date    COLONOSCOPY N/A 5/9/2017    COLONOSCOPY performed by Azalea Powell Darell Neff MD at [facility] ENDOSCOPY    COLONOSCOPY N/A 8/15/2022    COLONOSCOPY performed by Hans Sue MD at [facility] ENDOSCOPY    HX CHOLECYSTECTOMY      HX HYSTERECTOMY  1982    complete    HX KNEE ARTHROSCOPY Left 11/2016     Family History   Problem Relation Age of Onset    Hypertension Other     Diabetes Other     Cancer Other     Cancer Mother     Cancer Sister     Cancer Paternal Grandmother     Cancer Brother       Social History     Tobacco Use    Smoking status: Never    Smokeless tobacco: Never   Substance Use Topics    Alcohol use: No       Prior to Admission medications    Medication Sig Start Date End Date Taking? Authorizing Provider   DULoxetine (CYMBALTA) 60 mg capsule Take 60 mg by mouth daily. Yes Provider, Historical   methylphenidate HCl (RITALIN) 20 mg tablet Take 20 mg by mouth two (2) times a day. Yes Provider, Historical   metFORMIN ER (GLUCOPHAGE XR) 500 mg tablet  4/1/22  Yes Provider, Historical   Estring 2 mg (7.5 mcg /24 hour) vaginal ring  4/27/21  Yes Provider, Historical   Bydureon BCise 2 mg/0.85 mL atIn  5/18/21  Yes Provider, Historical   levothyroxine (SYNTHROID) 100 mcg tablet  5/30/21  Yes Provider, Historical   carvediloL (COREG) 3.125 mg tablet  1/24/21  Yes Provider, Historical   exenatide microspheres (Bydureon) 2 mg/0.65 mL pnij 2 mg by SubCUTAneous route every seven (7) days. Yes Provider, Historical   magnesium oxide (MAG-OX) 400 mg tablet Take 400 mg by mouth daily. Yes Provider, Historical   cholecalciferol (VITAMIN D3) (1000 Units /25 mcg) tablet Take 1,000 Units by mouth daily. Yes Provider, Historical   esomeprazole (NEXIUM) 40 mg capsule Take  by mouth daily. Yes Provider, Historical   oxyCODONE-acetaminophen (Percocet) 5-325 mg per tablet Take 1 Tablet by mouth every four to six (4-6) hours as needed for Pain for up to 14 days. Max Daily Amount: 6 Tablets.   Patient not taking: Reported on 9/23/2022 9/22/22 10/6/22  KATHI Yao   ondansetron [facility] ODT) 4 mg disintegrating tablet Take 1 Tablet by mouth every eight (8) hours as needed for Nausea, Vomiting or Nausea or Vomiting for up to 20 doses. Patient not taking: Reported on 9/23/2022 9/20/22   KATHI Green   cephALEXin (Keflex) 500 mg capsule Take 1 Capsule by mouth every eight (8) hours for 3 days. Start antibiotics after surgery  Patient not taking: Reported on 9/23/2022 9/20/22 9/23/22  KATHI Green     No Known Allergies       REVIEW OF SYSTEMS:       Total of 12 systems reviewed as follows:    Positive = Red  Constitutional: Negative for malaise/fatigue and weakness, negative for fever and chills   HENT: Negative for ear pain, headaches, negative for loss of sense of taste and smell   Eyes: Negative for blurred vision and double vision   Skin: Negative for itching, negative for open areas   Cardiovascular: Negative for chest pain, palpitations, negative for swelling   Respiratory: Negative for shortness or breath, negative for cough, negative for sputum production   Gastrointestinal: Negative for abdominal pain, constipation, nausea, vomiting, and diarrhea   Genitourinary: Negative for dysuria, frequency, and hematuria   Musculoskeletal: Decrease sensation to BLE   Neurological: Negative for dizziness, seizures, and headaches   Psychiatric: Negative for depression and anxiousness        Objective:   VITALS:    Visit Vitals  BP (!) 115/57   Pulse 81   Temp (!) 96.6 °F (35.9 °C)   Resp 16   Ht 5' 2\" (1.575 m)   Wt 76.2 kg (168 lb 1.6 oz)   SpO2 100%   BMI 30.75 kg/m²       PHYSICAL EXAM:  Positive = Red  Constitutional: Alert and oriented x 3 and no noted acute distress appears to be stated age.    HENT: Atraumatic, nose midline, oropharynx clear ad moist, trachea midline, no supraclavicular   Eyes: Conjunctiva normal and pupils equal   Skin: Dry, intact, warm, and dry   Cardiovascular: Regular rate and rhythm, normal heart sounds, no murmurs, pulses palpable, no noted edema   Respiratory: Lungs clear throughout, no wheezes, rales, or rhonchi, effort normal   Gastrointestinal: Appearance normal, bowel sounds are normal, bowl soft and non-tender   Genitourinary: Deferred   Musculoskeletal: Decreased ROM to the RLE   Neurological: Alert and oriented x 3, awake. No facial droop. No slurred speech. Hand grasps equal. Strength 5/5 in all extremities. Intact sensations   Psychiatric: Affect normal, Answers questions appropriately     __________________________________________________  Care Plan discussed with:    Comments   Patient X    Family      RN     Care Manager                    Consultant:      _______________________________________________________________________  Expected  Disposition:   Home with Family X   HH/PT/OT/RN    SNF/LTC    SAMIA    ________________________________________________________________________    Labs:  Recent Results (from the past 24 hour(s))   GLUCOSE, POC    Collection Time: 09/23/22  8:04 AM   Result Value Ref Range    Glucose (POC) 94 70 - 110 mg/dL    Performed by Mari Duncan        Imaging:  XR KNEE RT MAX 2 VWS    Result Date: 9/23/2022  EXAM: Right knee, 2 views COMPARISON: None. INDICATION: Right knee pain, status post right knee arthroplasty. _______________ FINDINGS: Portable AP and crosstable lateral views of the right knee were obtained. Surgical changes and hardware of total right knee arthroplasty. No lucency adjacent to the hardware to suggest complication. Expected postsurgical effusion, soft tissue swelling, and emphysema. No acute fracture. Adequate anatomic alignment of right knee prosthesis. _______________    Total right knee arthroplasty, without complication.       Assessment & Plan:     S/P total knee arthroplasty, right  -patient with history of OA  -continue pain management  -continue scheduled antibiotics  -PT consulted  -continue knee brace and ice pack    Hypothyroidism  -chronic, continue Synthroid    Primary hypertension  -chronic, patient BP was soft status post surgery  -will hold Coreg for now  -monitor BP closely    Diabetes Mellitus  -chronic, holding oral Metformin  -implement sliding scale insulin and accu-checks, prior to meals and bedtime    Depression  -chronic, continue Cymbalta    TOTAL TIME:  30 Minutes    Code Status: Full    Prophylaxis:  SCDs    Electronically Signed : Brielle Reilly [facility]    Please note that this dictation was completed with [employer], the computer voice recognition software. Quite often unanticipated grammatical, syntax, homophones, and other interpretive errors are inadvertently transcribed by the computer software. Please disregard these errors. Please excuse any errors that have escaped final proofreading. Thank you.

## 2022-09-23 NOTE — ANESTHESIA POSTPROCEDURE EVALUATION
Procedure(s):  Right TKA. spinal, general - backup, regional    Anesthesia Post Evaluation      Multimodal analgesia: multimodal analgesia used between 6 hours prior to anesthesia start to PACU discharge  Patient location during evaluation: bedside  Patient participation: complete - patient participated  Level of consciousness: awake and alert  Pain score: 0  Pain management: adequate  Airway patency: patent  Anesthetic complications: no  Cardiovascular status: acceptable and stable  Respiratory status: acceptable and room air  Hydration status: acceptable  Comments: Ok to discharge when post op criteria met.    Post anesthesia nausea and vomiting:  none  Final Post Anesthesia Temperature Assessment:  Normothermia (36.0-37.5 degrees C)      INITIAL Post-op Vital signs:   Vitals Value Taken Time   BP 96/53 09/23/22 1229   Temp     Pulse 84 09/23/22 1229   Resp 14 09/23/22 1229   SpO2 94 % 09/23/22 1229

## 2022-09-23 NOTE — DISCHARGE INSTRUCTIONS
TOTAL KNEE REPLACEMENT DISCHARGE INFORMATION    You have undergone a Total Knee Replacement. The following list is to provide you with some expectations over the next week upon your discharge from the hospital.     Please resume your Aspirin 81 mg every 12 hours (twice daily) starting tomorrow as directed until Dr. Alexandru Mora instructs you to discontinue it. If you are not sure which blood thinner to take please contact Dr. Sourav Parker office next business day for clarification. Please be sure to continue your thigh-high compression stockings on both sides until instructed to discontinue them. Over the course of the next week, you should continue thigh high stockings on the operative leg, DO NOT GET THE INCISION WET until instructed to do so. Please make sure the stockings on the operative leg are pulled up all the way to the thigh to prevent any creases which may result in abrasions or creases in the skin. If the stockings are creating creases resulting in abrasions or blistering on the operative leg please remove the stockings. You may take the stockings off on the nonoperative leg once you arrive home. You may notice some bruising on your thigh and it may extend all the way to the ankles. That is perfectly normal early on. You may experience a clicking noise in your knee and that is normal because of the artificial knee. It is important to remember if you have any surgical procedure including dental procedures which may result in bleeding that an antibiotic 1 hour before the procedure will be required. Please let the provider performing the procedure know that you have artificial joint. If an antibiotic is not given by them please call our office and give us at least 5 business days to get you the appropriate antibiotics if needed. This rule applies indefinitely. If an Ace wrap is placed on your knee you may remove the Ace wrap only 48 hours after your surgery. We will leave the stockings on.   During the course of your  over the next week, should you experience fevers of 101.5 F, a white drainage from the incision, extreme redness around the incision, or the incision begins to have a pungent smell; Please call our office or page Dr. Stefan Crabtree whose numbers are provided in your discharge paperwork. To Page Dr. Stefan Crabtree please call [phone] and dial [phone]. Have the  page whomever is on call for Orthopedics. These are signs of infection and it should be addressed immediately. Please do not drive until instructed to do so. If you need a refill on pain medication please allow at least 2 business days notice for any refills. Immediate refill request may not be possible. Medication refill requests will not be addressed during non-business hours. Please do not page the on-call provider for pain medication refills after hours. It is very important for you to begin your Outpatient Physical Therapy within a couple days of the day of your discharge and your appointment should have been set up. If your physical therapy has not been set up please call our office the next business day for assistance. Details provided in a separate sheet. Remove ace wrap in 48 hrs after surgery but keep stockings on. You may remove the stocking and keep the stocking off on the nonoperative leg. Finish all antibiotics, start the antibiotics as soon as you go home if you have prescribed antibiotics. 14.  You should perform your daily home exercises at least 4 times a day 30 minutes each time. Perform foot pumps on both feet at least 10 times every 15 minutes while awake. This helps prevent swelling in the leg and can help prevent blood clots in the leg. On the operative leg if you have significant swelling you can also lay down flat and put 3 pillows under the heel so the heel is above the heart level and then perform foot pumps 4 times a day for 10 minutes to help bring the swelling down.   15.  Do not place anything under your knee while sleeping at night. Elevate your heel so your  is straight while sleeping at night. 16.  Perform deep breathing exercises 10 times every hour while awake. 17.  If you had a nerve block and you are not having pain the day of the surgery, at nighttime it is okay to take 1 pain medication before going to sleep to help prevent excruciating pain when the nerve block wears off. 18.  You may be given an ice pack machine use that to help prevent swelling. Do not apply heat to the incision area. 19.  While you are awake at least 10 times every 30 minutes move your foot up and down as if you are pumping gas from both feet to help prevent swelling and to promote blood circulation in the calf. 20.  If you develop sudden onset of shortness of breath or severe calf pain please go to closest emergency room. 21. Your pain medicine is a Narcotic and may cause constipation. You may take an over the counter stool softener while taking pain medicine. 25.  You will get surveys either via text message or email after your surgery on a periodic basis. Please participate in the surveys as it helps to track your progress. ICE THERAPY WRAP:    Keep ice therapy wrap on when resting. DO not wear when moving or walking. Ice packs are reusable. Ice Therapy wrap holds two ice packs at a time. Things to watch for:             Increased swelling of the surgical site             Spreading of redness around the incision site             Drainage of pus from the incision site             Developing a fever of 101.5 °F or higher             If any of these symptoms occur you have any questions please contact our office at [phone]. If you need to talk to Dr. Lion Atkinson or his staff after hours please call the office and have the on-call service get in touch with the provider on call that day. Please note pain medications are not refilled after hours or on weekends.   If Dr. Lion Atkinson or his staff do not call you back within 30 minutes. Please tell the  to try again. Phone: [phone]  [email or web address]

## 2022-09-23 NOTE — PERIOP NOTES
Timeout completed prior to peripheral nerve block with patient, ALBINO Beach and Aurelio Hector RN. \"Block\" written on right leg by ALBINO Garcia CRNA.

## 2022-09-23 NOTE — PROGRESS NOTES
Problem: Mobility Impaired (Adult and Pediatric)  Goal: *Acute Goals and Plan of Care (Insert Text)  Outcome: Progressing Towards Goal  Note:   Pt will be able to ambulate with LRAD x 200 feet with Tamera. Pt will be able to negotiate steps via step-to pattern using handrails with SBA. Problem: Patient Education: Go to Patient Education Activity  Goal: Patient/Family Education  Outcome: Progressing Towards Goal  Note:   Pt will verbalize and/or demonstrate understanding of HEP to improve R knee ROM & strength. PHYSICAL THERAPY EVALUATION     Patient: Sri Salinas (69 y.o. female)  Date: 2022   Start Time:    Stop Time: 3772  $$ Initial PT Evaluation: Low Complex 20 Min  $$ Gait Trainin-22 mins           Primary Diagnosis: Osteoarthritis of right knee, unspecified osteoarthritis type [M17.11]  OA (osteoarthritis) of knee [M17.10]  S/P total knee arthroplasty, right [Z96.651]  Procedure(s) (LRB):  Right TKA (Right) Day of Surgery   Precautions:   WBAT    ASSESSMENT :  Based on the objective data described below, the patient presents s/p right TKA and is WBAT. Pt is able to ambulate with a RW with CGA>SBA secondary to incresaed pain and safety concerns. Pt is educated on a HEP and provided with pictorial handout. Pt would benefit from further P.T. to improve strength, gait, and stair navigation. Pt can return home with outpatient P.T. PLAN :  Recommendations and Planned Interventions:   Patient to be seen 1-2x/day, 4-7 days/wk. Discharge Recommendations: Outpatient PT  AM-PAC:   Further Equipment Recommendations for Discharge: RW     SUBJECTIVE:   Patient supine in bed upon entering room with CNA, assisted CNA with repositioning and changing of bed linens and diaper due to incontinence. OBJECTIVE DATA SUMMARY:     Past Medical History:   Diagnosis Date    Adverse effect of anesthesia     woke up during hysterectomy. Placed back to sleep.     Chronic fatigue syndrome  Onset by Keegan Dominguez    Chronic pain     lower back    Depression     1  time per patient no meds    Diabetes ([facility] [address]     Type 2    Fibromyalgia 2008    onset by Keegan Dominguez    Fracture 09/2012    right wrist    Fracture of right wrist 2011    no surgery    GERD (gastroesophageal reflux disease)     Guillain-Medora syndrome ([facility] [address] 2008    could not see or walk, lost weight, could not write, no respiratory Ali Karie Najera). Caused by extreme stress    Hiatal hernia     HX OTHER MEDICAL     acute poor vision, double vision, need glasses due to [facility]. Hypertension     Hypothyroidism     Insomnia     Lyme disease 2008    Shingles 2008    Thromboembolus [facility]) 2021    Eliquis DC'd    Unspecified adverse effect of anesthesia     pt states she woke up during hysterectomy and put back to sleep.     Unspecified sleep apnea     not using cpap currently needs new mask     Past Surgical History:   Procedure Laterality Date    COLONOSCOPY N/A 5/9/2017    COLONOSCOPY performed by Flash Mayer MD at [facility]    COLONOSCOPY N/A 8/15/2022    COLONOSCOPY performed by Eligio Enciso MD at [address] [place] [address]    complete    HX KNEE ARTHROSCOPY Left 11/2016     Barriers to Learning/Limitations: None  Compensate with: N/A  Home Situation:   Home Situation  Home Environment: Private residence  # Steps to Enter: 5  Rails to Enter: Yes  Hand Rails : Bilateral  One/Two Story Residence: One story  Living Alone: No  Support Systems: Spouse/Significant Other, Child(naila)  Patient Expects to be Discharged to[de-identified] Home  Current DME Used/Available at Home: Cane, straight  Critical Behavior:                       Skin Integrity: Incision (comment) (right knee)  Skin Integumentary  Skin Integrity: Incision (comment) (right knee)     Strength:    Strength: Generally decreased, functional (grossly 4+/5, SLR performed at 1715)        Tone & Sensation:     Sensation: Intact    Range Of Motion:   AROM: Generally decreased, functional (0-95 degrees)    PROM: Generally decreased, functional (0-100 degrees)     Posture:  Posture (WDL): Exceptions to WDL  Posture Assessment: Forward head;Rounded shoulders  Functional Mobility:  Bed Mobility:  Rolling: Modified independent  Supine to Sit: Modified independent  Sit to Supine: Modified independent  Scooting: Independent  Transfers:  Sit to Stand: Contact guard assistance  Stand to Sit: Stand-by assistance  Stand Pivot Transfers: Contact guard assistance     Bed to Chair: Contact guard assistance (with RW)    Balance:   Sitting: Intact  Standing: Intact; With support; Impaired  Standing - Static: Constant support;Good  Standing - Dynamic : Fair;Constant support  Ambulation/Gait Training:  Distance (ft): 100 Feet (ft)  Assistive Device: Walker, rolling  Ambulation - Level of Assistance: Contact guard assistance;Stand-by assistance     Gait Description (WDL): Exceptions to WDL  Gait Abnormalities: Antalgic  Right Side Weight Bearing: As tolerated    Stairs:  Number of Stairs Trained:  (not performed at this time secondary decreased endurance & increased pain with function)         AM-PAC:  16/24; Current research shows that an AM-PAC score of 17 or less is typically not associated with a discharge to the patient's home setting, whereas a score of 18 or greater is typically associated with a discharge to the patient's home setting. Today's [place]:   Pt is able to ambulate with a RW with CGA > SBA within room & hallway. Pt able to move sit to/from stand from bed & from bedside nirmala with arms. Pt is educated on a HEP and states understanding. Pain:  Pain level pre-treatment: 8/10   Pain level post-treatment: 9/10  Pain Location: Right knee  Pain Intervention(s): Medication (see MAR);  Rest, Ice, Repositioning   Response to intervention: Nurse notified, See doc flow    Activity Tolerance:   Decreased with exertion   Please refer to the flowsheet for vital signs taken during this treatment. After treatment:   []         Patient left in no apparent distress sitting up in chair  [x]         Patient left in no apparent distress in bed  [x]         Call bell left within reach  [x]         Nursing notified  []         Caregiver present  []         Bed alarm activated  []         SCDs applied    COMMUNICATION/EDUCATION:   [x]         Role of Physical Therapy in the acute care setting. [x]         Fall prevention education was provided and the patient/caregiver indicated understanding. [x]         Patient/family have participated as able in goal setting and plan of care. []         Patient/family agree to work toward stated goals and plan of care. []         Patient understands intent and goals of therapy, but is neutral about his/her participation. []         Patient is unable to participate in goal setting/plan of care: ongoing with therapy staff.  []         Other:     Thank you for this referral.  Clari Workman, PT, DPT   Time Calculation: 29 mins

## 2022-09-23 NOTE — ANESTHESIA PREPROCEDURE EVALUATION
Relevant Problems   ENDOCRINE   (+) Hypothyroidism   (+) Sacroiliitis, not elsewhere classified ([facility] [address]       Anesthetic History     History of awareness of surgery under anesthesia          Review of Systems / Medical History  Patient summary reviewed, nursing notes reviewed and pertinent labs reviewed    Pulmonary        Sleep apnea           Neuro/Psych         Psychiatric history     Cardiovascular    Hypertension              Exercise tolerance: >4 METS     GI/Hepatic/Renal     GERD      Hiatal hernia     Endo/Other    Diabetes: type 2  Hypothyroidism  Obesity and arthritis     Other Findings              Physical Exam    Airway  Mallampati: II  TM Distance: 4 - 6 cm  Neck ROM: normal range of motion   Mouth opening: Normal     Cardiovascular  Regular rate and rhythm,  S1 and S2 normal,  no murmur, click, rub, or gallop  Rhythm: regular  Rate: normal         Dental  No notable dental hx       Pulmonary  Breath sounds clear to auscultation               Abdominal  GI exam deferred       Other Findings            Anesthetic Plan    ASA: 2  Anesthesia type: spinal, general - backup and regional - saphenous block      Post-op pain plan if not by surgeon: peripheral nerve block single    Induction: Intravenous  Anesthetic plan and risks discussed with: Patient

## 2022-09-23 NOTE — PROGRESS NOTES
1310- received care of patient from PACU to room 253. Patient is awake and alert. Vitals stable. Patient oriented to room and CB system. Vss. CB in reach     1700- PT in tp work with patient.  Patient voided and ambulated with PT.     1820- SCDs placed on patient

## 2022-09-23 NOTE — ASSESSMENT & PLAN NOTE
-patient with history of OA  -continue pain management  -continue scheduled antibiotics  -PT consulted  -continue knee brace and ice pack

## 2022-09-23 NOTE — ANESTHESIA PROCEDURE NOTES
Peripheral Block    Start time: 9/23/2022 10:37 AM  End time: 9/23/2022 10:42 AM  Performed by: Stephany Farooq CRNA  Authorized by: Stephany Farooq CRNA       Pre-procedure: Indications: at surgeon's request and post-op pain management    Preanesthetic Checklist: patient identified, risks and benefits discussed, site marked, timeout performed, anesthesia consent given, patient being monitored and fire risk safety assessment completed and verbalized    Timeout Time: 10:37 EDT Maida Carrillo RN)      Block Type:   Block Type:   Adductor canal block  Laterality:  Right  Monitoring:  Standard ASA monitoring, continuous pulse ox, frequent vital sign checks, heart rate, oxygen and responsive to questions  Injection Technique:  Single shot  Procedures: ultrasound guided    Patient Position: supine  Prep: chlorhexidine    Location:  Mid thigh  Needle Type:  Ultraplex  Needle Gauge:  20 G  Needle Localization:  Ultrasound guidance  Medication Injected:  Midazolam (VERSED) injection - IntraVENous   4 mg - 9/23/2022 10:41:00 AM  bupivacaine (PF) (MARCAINE) 0.5% injection - Peripheral Nerve Block   20 mL - 9/23/2022 10:41:00 AM  dexamethasone (DECADRON) 4 mg/mL injection - Peripheral Nerve Block   4 mg - 9/23/2022 10:41:00 AM  Med Admin Time: 9/23/2022 10:41 AM    Assessment:  Number of attempts:  1  Injection Assessment:  Incremental injection every 5 mL, no paresthesia, ultrasound image on chart, local visualized surrounding nerve on ultrasound, negative aspiration for blood and low pressure verified by pressure monitor  Patient tolerance:  Patient tolerated the procedure well with no immediate complications

## 2022-09-24 VITALS
HEART RATE: 110 BPM | TEMPERATURE: 97.2 F | WEIGHT: 168.1 LBS | HEIGHT: 62 IN | OXYGEN SATURATION: 100 % | DIASTOLIC BLOOD PRESSURE: 70 MMHG | RESPIRATION RATE: 16 BRPM | SYSTOLIC BLOOD PRESSURE: 150 MMHG | BODY MASS INDEX: 30.93 KG/M2

## 2022-09-24 LAB
ANION GAP SERPL CALC-SCNC: 8 MMOL/L (ref 3–18)
BASOPHILS # BLD: 0 K/UL (ref 0–0.1)
BASOPHILS NFR BLD: 0 % (ref 0–2)
BUN SERPL-MCNC: 18 MG/DL (ref 7–18)
BUN/CREAT SERPL: 25 (ref 12–20)
CA-I BLD-MCNC: 8.9 MG/DL (ref 8.5–10.1)
CHLORIDE SERPL-SCNC: 104 MMOL/L (ref 100–111)
CO2 SERPL-SCNC: 26 MMOL/L (ref 21–32)
CREAT SERPL-MCNC: 0.71 MG/DL (ref 0.6–1.3)
DIFFERENTIAL METHOD BLD: ABNORMAL
EOSINOPHIL # BLD: 0 K/UL (ref 0–0.4)
EOSINOPHIL NFR BLD: 0 % (ref 0–5)
ERYTHROCYTE [DISTWIDTH] IN BLOOD BY AUTOMATED COUNT: 13.3 % (ref 11.6–14.5)
GLUCOSE BLD STRIP.AUTO-MCNC: 126 MG/DL (ref 70–110)
GLUCOSE BLD STRIP.AUTO-MCNC: 164 MG/DL (ref 70–110)
GLUCOSE SERPL-MCNC: 155 MG/DL (ref 74–99)
HCT VFR BLD AUTO: 32 % (ref 35–45)
HGB BLD-MCNC: 10.4 G/DL (ref 12–16)
IMM GRANULOCYTES # BLD AUTO: 0.1 K/UL (ref 0–0.04)
IMM GRANULOCYTES NFR BLD AUTO: 1 % (ref 0–0.5)
LYMPHOCYTES # BLD: 1.6 K/UL (ref 0.9–3.6)
LYMPHOCYTES NFR BLD: 9 % (ref 21–52)
MCH RBC QN AUTO: 29.7 PG (ref 24–34)
MCHC RBC AUTO-ENTMCNC: 32.5 G/DL (ref 31–37)
MCV RBC AUTO: 91.4 FL (ref 78–100)
MONOCYTES # BLD: 1.1 K/UL (ref 0.05–1.2)
MONOCYTES NFR BLD: 6 % (ref 3–10)
NEUTS SEG # BLD: 14.6 K/UL (ref 1.8–8)
NEUTS SEG NFR BLD: 84 % (ref 40–73)
NRBC # BLD: 0 K/UL (ref 0–0.01)
NRBC BLD-RTO: 0 PER 100 WBC
PERFORMED BY, TECHID: ABNORMAL
PERFORMED BY, TECHID: ABNORMAL
PLATELET # BLD AUTO: 289 K/UL (ref 135–420)
PMV BLD AUTO: 10.4 FL (ref 9.2–11.8)
POTASSIUM SERPL-SCNC: 4 MMOL/L (ref 3.5–5.5)
RBC # BLD AUTO: 3.5 M/UL (ref 4.2–5.3)
SODIUM SERPL-SCNC: 138 MMOL/L (ref 136–145)
WBC # BLD AUTO: 17.5 K/UL (ref 4.6–13.2)

## 2022-09-24 PROCEDURE — 74011000250 HC RX REV CODE- 250: Performed by: NURSE PRACTITIONER

## 2022-09-24 PROCEDURE — 82962 GLUCOSE BLOOD TEST: CPT

## 2022-09-24 PROCEDURE — 80048 BASIC METABOLIC PNL TOTAL CA: CPT

## 2022-09-24 PROCEDURE — G0378 HOSPITAL OBSERVATION PER HR: HCPCS

## 2022-09-24 PROCEDURE — 74011636637 HC RX REV CODE- 636/637: Performed by: NURSE PRACTITIONER

## 2022-09-24 PROCEDURE — 74011250637 HC RX REV CODE- 250/637: Performed by: NURSE PRACTITIONER

## 2022-09-24 PROCEDURE — 97116 GAIT TRAINING THERAPY: CPT

## 2022-09-24 PROCEDURE — 85025 COMPLETE CBC W/AUTO DIFF WBC: CPT

## 2022-09-24 PROCEDURE — 36415 COLL VENOUS BLD VENIPUNCTURE: CPT

## 2022-09-24 RX ORDER — CEPHALEXIN 500 MG/1
500 CAPSULE ORAL EVERY 8 HOURS
Qty: 9 CAPSULE | Refills: 0 | Status: SHIPPED
Start: 2022-09-24 | End: 2022-09-27

## 2022-09-24 RX ADMIN — SENNOSIDES 8.6 MG: 8.6 TABLET, FILM COATED ORAL at 09:06

## 2022-09-24 RX ADMIN — OXYCODONE AND ACETAMINOPHEN 2 TABLET: 5; 325 TABLET ORAL at 11:36

## 2022-09-24 RX ADMIN — SODIUM CHLORIDE, PRESERVATIVE FREE 10 ML: 5 INJECTION INTRAVENOUS at 05:32

## 2022-09-24 RX ADMIN — INSULIN LISPRO 2 UNITS: 100 INJECTION, SOLUTION INTRAVENOUS; SUBCUTANEOUS at 07:38

## 2022-09-24 RX ADMIN — ASPIRIN 81 MG: 81 TABLET, CHEWABLE ORAL at 09:06

## 2022-09-24 RX ADMIN — PANTOPRAZOLE SODIUM 40 MG: 40 TABLET, DELAYED RELEASE ORAL at 06:42

## 2022-09-24 RX ADMIN — DULOXETINE HYDROCHLORIDE 60 MG: 30 CAPSULE, DELAYED RELEASE ORAL at 09:06

## 2022-09-24 RX ADMIN — LEVOTHYROXINE SODIUM 100 MCG: 0.1 TABLET ORAL at 05:54

## 2022-09-24 NOTE — PROGRESS NOTES
Problem: Falls - Risk of  Goal: *Absence of Falls  Description: Document Bard  Fall Risk and appropriate interventions in the flowsheet.   Outcome: Progressing Towards Goal  Note: Fall Risk Interventions:  Mobility Interventions: Utilize walker, cane, or other assistive device         Medication Interventions: Teach patient to arise slowly         History of Falls Interventions: Door open when patient unattended

## 2022-09-24 NOTE — DISCHARGE SUMMARY
Discharge Summary     Patient: Rosina Mohr MRN: [id number]  SSN: [id number]    YOB: 1945  Age: 69 y.o. Sex: female       Admit Date: 9/23/2022    Discharge Date: 9/24/2022      Admission Diagnoses: Osteoarthritis of right knee, unspecified osteoarthritis type [M17.11];OA (osteoarthritis) of knee [M17.10];S/P total knee arthroplasty, right [Z96.651]    Discharge Diagnoses:   Problem List as of 9/24/2022 Date Reviewed: 9/23/2022            Codes Class Noted - Resolved    OA (osteoarthritis) of knee ICD-10-CM: M17.10  ICD-9-CM: 715.36  9/23/2022 - Present        S/P total knee arthroplasty, right ICD-10-CM: Z96.651  ICD-9-CM: V43.65  9/23/2022 - Present        Primary hypertension ICD-10-CM: I10  ICD-9-CM: 401.9  9/23/2022 - Present        Neck pain ICD-10-CM: M54.2  ICD-9-CM: 723.1  10/1/2014 - Present        Low back pain ICD-10-CM: M54.50  ICD-9-CM: 724.2  10/1/2014 - Present        Chronic pain syndrome ICD-10-CM: G89.4  ICD-9-CM: 338.4  2/15/2013 - Present        Fatigue ICD-10-CM: R53.83  ICD-9-CM: 780.79  8/8/2012 - Present        Hypothyroidism ICD-10-CM: E03.9  ICD-9-CM: 244.9  8/8/2012 - Present        Myalgia and myositis, unspecified ICD-10-CM: SQN1213  ICD-9-CM: 729.1  Unknown - Present        Chronic low back pain ICD-10-CM: M54.50, G89.29  ICD-9-CM: 724.2, 338.29  Unknown - Present        Sacroiliitis, not elsewhere classified ([facility] ICD-10-CM: M46.1  ICD-9-CM: 720.2  Unknown - Present        Enthesopathy of hip region ICD-10-CM: M76.899  ICD-9-CM: 726.5  Unknown - Present        Pain in limb ICD-10-CM: M79.609  ICD-9-CM: 729.5  Unknown - Present        Encounter for long-term (current) use of high-risk medication ICD-10-CM: Z79.899  ICD-9-CM: V58.69  Unknown - Present            Discharge Condition: Good    Hospital Course:      Rosina Mohr is a 69 y.o. with a past medical history for Hypertension, diabetes Mellitus Type 2, Depression, Osteoarthritis, Nan Bartlett Katiuska Webster, and Hypothyroidism, patient presented to the facility today to undergo a right total knee arthroplasty with Dr. Karlo Kay. Hospital medicine consulted due to patient with multiple chronic diagnosis for medication management and overnight observation. Patient assessed at the bedside, patient is alert and oriented x3, there are no acute signs and symptoms of distress. Patient denies chest pain, shortness of breath, nausea, vomiting, diarrhea. Patient agrees to admission for overnight observation, with plans to discharge home in the morning. Admit to medical surgical unit. Patient tolerated PT well. Will be discharged with outpatient PT    Consults: None    Significant Diagnostic Studies:     Physical:    Visit Vitals  BP (!) 150/70 (BP Patient Position: Semi fowlers)   Pulse (!) 110   Temp 97.2 °F (36.2 °C)   Resp 16   Ht 5' 2\" (1.575 m)   Wt 76.2 kg (168 lb 1.6 oz)   SpO2 100%   BMI 30.75 kg/m²     Gen: NAD  Neuro: AAOx3  Chest: RRR, no murmurs  Lungs: CTAB, no wheezing or rhonchi  Abdomen: S/NT/ND/normoactive bowel sounds  Extremities: No pitting edema  Skin: No rashes      Disposition: home    Discharge Medications:   Current Discharge Medication List        CONTINUE these medications which have CHANGED    Details   cephALEXin (Keflex) 500 mg capsule Take 1 Capsule by mouth every eight (8) hours for 3 days. Start antibiotics after surgery  Qty: 9 Capsule, Refills: 0           CONTINUE these medications which have NOT CHANGED    Details   DULoxetine (CYMBALTA) 60 mg capsule Take 60 mg by mouth daily. methylphenidate HCl (RITALIN) 20 mg tablet Take 20 mg by mouth two (2) times a day.       metFORMIN ER (GLUCOPHAGE XR) 500 mg tablet       Estring 2 mg (7.5 mcg /24 hour) vaginal ring       Bydureon BCise 2 mg/0.85 mL atIn       levothyroxine (SYNTHROID) 100 mcg tablet       carvediloL (COREG) 3.125 mg tablet       exenatide microspheres (Bydureon) 2 mg/0.65 mL pnij 2 mg by SubCUTAneous route every seven (7) days. magnesium oxide (MAG-OX) 400 mg tablet Take 400 mg by mouth daily. cholecalciferol (VITAMIN D3) (1000 Units /25 mcg) tablet Take 1,000 Units by mouth daily. esomeprazole (NEXIUM) 40 mg capsule Take  by mouth daily. oxyCODONE-acetaminophen (Percocet) 5-325 mg per tablet Take 1 Tablet by mouth every four to six (4-6) hours as needed for Pain for up to 14 days. Max Daily Amount: 6 Tablets. Qty: 30 Tablet, Refills: 0    Comments: Do no take until after surgery  Associated Diagnoses: Primary osteoarthritis of right knee      ondansetron (ZOFRAN ODT) 4 mg disintegrating tablet Take 1 Tablet by mouth every eight (8) hours as needed for Nausea, Vomiting or Nausea or Vomiting for up to 20 doses. Qty: 20 Tablet, Refills: 0             Activity: Activity as tolerated  Diet: Regular Diet  Wound Care: None needed    Follow-up Appointments   Procedures    FOLLOW UP VISIT Appointment in: One Week     Standing Status:   Standing     Number of Occurrences:   1     Order Specific Question:   Appointment in     Answer:    One Week       Signed By: Natasha Blackwell MD     September 24, 2022      >30 minutes spent on discharge

## 2022-09-24 NOTE — PROGRESS NOTES
Problem: Falls - Risk of  Goal: *Absence of Falls  Description: Document Rigoberto Cease Fall Risk and appropriate interventions in the flowsheet.   Outcome: Progressing Towards Goal  Note: Fall Risk Interventions:  Mobility Interventions: Utilize walker, cane, or other assistive device, PT Consult for assist device competence, PT Consult for mobility concerns, Patient to call before getting OOB         Medication Interventions: Teach patient to arise slowly, Patient to call before getting OOB         History of Falls Interventions: Door open when patient unattended, Room close to nurse's station

## 2022-09-24 NOTE — PROGRESS NOTES
Care Management Interventions  PCP Verified by CM: Yes (No recent visits to review.)  Mode of Transport at Discharge: Self  Transition of Care Consult (CM Consult): Discharge Planning  Physical Therapy Consult: Yes  Support Systems: Spouse/Significant Other  Confirm Follow Up Transport: Self  The Plan for Transition of Care is Related to the Following Treatment Goals : Pt cented discharge planning/transition of care will be pt centered to ensure neda transition to home, community, and PLOF. The Patient and/or Patient Representative was Provided with a Choice of Provider and Agrees with the Discharge Plan?: Yes  Name of the Patient Representative Who was Provided with a Choice of Provider and Agrees with the Discharge Plan: Pt.   Shannan Ramírez  [place] of Choice List was Provided with Basic Dialogue that Supports the Patient's Individualized Plan of Care/Goals, Treatment Preferences and Shares the Quality Data Associated with the Providers?: Yes  Discharge Location  Patient Expects to be Discharged to[de-identified] Home with outpatient services   Chart reviewed and pt interviewed at bedside. Pt placed in OBS status S/P  right knee knee replacement. She is alert and oriented and able to answer all questions appropriately. She stated that \" everything has been wonderful and so nice. \"  Denies any needs at this time and states she is being discharged soon. Discharge packet provided by nursing.

## 2022-09-24 NOTE — PROGRESS NOTES
1900 - Assumed care of pt, shift report given    1932 - VSS. Assessment completed. Dressing to right knee clean dry and intact. Pedal pulses palpable. Pt c/o 8/10 right knee pain, prn Toradol given, Assisted to bathroom and back to bed. Fresh ice water given. Pt denies any further needs at this time. CBWR     2118 - PRN percocet given. , 6U SSI given. 0044 - VSS. Pt resting quietly in bed, denies any needs at this time. CBWR     9919 - Assisted to bathroom (voided) and back to bed. Fresh ice pack placed on knee. Pt denies any further needs at this time. CBWR     0556 - Scheduled synthroid given. Fresh ice water at bedside. 1544 - Scheduled medication given pt tolerated well.

## 2022-09-24 NOTE — PROGRESS NOTES
Problem: Falls - Risk of  Goal: *Absence of Falls  Description: Document Maria Isabel Fall Risk and appropriate interventions in the flowsheet.   9/24/2022 0918 by Erinn Wilcox  Outcome: Resolved/Met  9/24/2022 0915 by Erinn Wilcox  Outcome: Progressing Towards Goal  Note: Fall Risk Interventions:  Mobility Interventions: Utilize walker, cane, or other assistive device         Medication Interventions: Teach patient to arise slowly         History of Falls Interventions: Door open when patient unattended

## 2022-09-24 NOTE — PROGRESS NOTES
Problem: Mobility Impaired (Adult and Pediatric)  Goal: *Acute Goals and Plan of Care (Insert Text)  Outcome: Progressing Towards Goal   PHYSICAL THERAPY TREATMENT    Patient: Braden Franco (69 y.o. female)  Date: 9/24/2022  Diagnosis: Osteoarthritis of right knee, unspecified osteoarthritis type [M17.11]  OA (osteoarthritis) of knee [M17.10]  S/P total knee arthroplasty, right [Z96.651] <principal problem not specified>  Procedure(s) (LRB):  Right TKA (Right) 1 Day Post-Op  Precautions:      ASSESSMENT:  Patient tolerated session well with no complaints or adverse reactions. Patient was able to ambulate long distance without rest break and navigated stairs with no assist.  Progression toward goals:   [x]      Improving appropriately and progressing toward goals  []      Improving slowly and progressing toward goals  []      Not making progress toward goals and plan of care will be adjusted     PLAN:  Patient continues to benefit from skilled intervention to address the above impairments. Continue treatment per established plan of care. Discharge Recommendations:  Outpatient  Further Equipment Recommendations for Discharge:  rolling walker     SUBJECTIVE:   Patient reports \"It is stiff today! \"    OBJECTIVE DATA SUMMARY:   Critical Behavior:              Functional Mobility Training:  Bed Mobility:  Rolling: Modified independent  Supine to Sit: Modified independent  Sit to Supine: Modified independent  Scooting: Independent         Transfers:  Sit to Stand: Stand-by assistance  Stand to Sit: Stand-by assistance                             Balance:  Sitting: Intact  Standing: Intact; With support  Standing - Static: Good;Constant support  Standing - Dynamic : Good;Constant support   Wheelchair Mobility:          Ambulation/Gait Training:  Distance (ft): 300 Feet (ft)  Assistive Device: Walker, rolling  Ambulation - Level of Assistance: Contact guard assistance;Stand-by assistance        Gait Abnormalities: Antalgic                                   Stairs:  Patient navigated x4 steps with B HR CGA. Patient cued for step to gait pattern. Pain:  Pain level pre-treatment: not rated/10  Pain level post-treatment: not rated/10   Pain location:   Pain Intervention(s): None needed, nursing notified      Activity Tolerance:   Please refer to the flowsheet for vital signs taken during this treatment. After treatment:   [x] Patient left in no apparent distress on commode  [] Patient left in no apparent distress in bed  [x] Call bell left within reach  [x] Nursing notified  [] Caregiver present  [] Bed alarm activated  [] SCDs applied      COMMUNICATION/EDUCATION:   [x]         Role of Physical Therapy in the acute care setting. [x]         Fall prevention education was provided and the patient/caregiver indicated understanding. [x]         Patient/family have participated as able in working toward goals and plan of care. [x]         Patient/family agree to work toward stated goals and plan of care. []         Patient understands intent and goals of therapy, but is neutral about his/her participation.   []         Patient is unable to participate in stated goals/plan of care: ongoing with therapy staff.  []         Other:        Onofre Monaco PTA   Time Calculation: 30 mins

## 2022-09-24 NOTE — PROGRESS NOTES
0700-Report received from off going nurse. Assumed care of patient. 0909-Assessment complete. Scheduled meds given. Patient tolerated well. 0945-Discharge instructions given. Patient verbalized understanding. IV removed. No complications. Patient tolerated well.

## 2022-09-26 ENCOUNTER — HOSPITAL ENCOUNTER (OUTPATIENT)
Dept: PHYSICAL THERAPY | Age: 77
Discharge: HOME | End: 2022-09-26
Payer: MEDICARE

## 2022-09-26 PROCEDURE — 97016 VASOPNEUMATIC DEVICE THERAPY: CPT

## 2022-09-26 PROCEDURE — 97110 THERAPEUTIC EXERCISES: CPT

## 2022-09-26 PROCEDURE — 97162 PT EVAL MOD COMPLEX 30 MIN: CPT

## 2022-09-26 PROCEDURE — 97535 SELF CARE MNGMENT TRAINING: CPT

## 2022-09-26 NOTE — PROGRESS NOTES
PT DAILY TREATMENT NOTE     Patient Name: Veena Kennedy  Date:2022  : 1945  [x]  Patient  Verified  Payor: Dara Anshu / Plan: [place] MED[place] PART [place] & B / Product Type: Medicare /    In time:145pm  Out time:225pm  Total Treatment Time (min): 40  Visit #: 1 of     Medicare/BCBS Only   Total Timed Codes (min):  23 1:1 Treatment Time:  40       Treatment Area: Right knee pain [M25.561]    SUBJECTIVE  Pain Level (0-10 scale): 8  Any medication changes, allergies to medications, adverse drug reactions, diagnosis change, or new procedure performed?: [x] No    [] Yes (see summary sheet for update)  Subjective functional status/changes:   [] No changes reported  See eval    OBJECTIVE    Modality rationale: decrease edema, decrease inflammation, and decrease pain to improve the patients ability to manage self care. Min Type Additional Details   10 [x]  Vasopneumatic Device    [x]  Right     []  Left  Pre-treatment girth: 47.5 cm  Post-treatment girth: 46.5 cm  Measured at (location): midpatella Pressure:       [x] lo [] med [] hi   Temperature: [x] lo [] med [] hi   [] Skin assessment post-treatment:  []intact []redness- no adverse reaction    []redness - adverse reaction:     17 min [x]Eval                  []Re-Eval       10 min Therapeutic Exercise:  [] See flow sheet : HEP   Rationale: increase ROM and increase strength to improve the patients ability to manage ADLs. 13 min Self Care/Home Management: pt education on icing and elevation daily (3x/day) to improve swelling and pain. Educated on locking rollator breaks to reduce fall risk when standing from the rollator or a chair x 2    Rationale:  improve safety and reduce edema   to improve the patients ability to manage self care.           With   [] TE   [] TA   [] neuro   [] other: Patient Education: [x] Review HEP    [] Progressed/Changed HEP based on:   [] positioning   [] body mechanics   [] transfers   [] heat/ice application [] other:      Other Objective/Functional Measures: see eval     Pain Level (0-10 scale) post treatment: 7    ASSESSMENT/Changes in Function: see POC    Patient will continue to benefit from skilled PT services to modify and progress therapeutic interventions, address functional mobility deficits, address ROM deficits, address strength deficits, analyze and address soft tissue restrictions, analyze and cue movement patterns, analyze and modify body mechanics/ergonomics, assess and modify postural abnormalities, address imbalance/dizziness, and instruct in home and community integration to attain remaining goals. [x]  See Plan of Care  []  See progress note/recertification  []  See Discharge Summary         Progress towards goals / Updated goals:  Short Term Goals: To be accomplished in 2 weeks:  Pt will report HEP compliance to maximize therapeutic outcome attainment. Eval: HEP assigned  Pt will perform a STS from an 18 inch chair with BUE on her first try to improve independence with transfers. Eval: multiple start-ups with pain  Long Term Goals: To be accomplished in 4 weeks:  Pt will improve her FOTO to 44, demonstrating improved functional ADL capacity. Eval: 22  Pt will improve her right knee strength to 4/5 MMT to improve stability with stair negotiation. Eval: 3/5  Pt will improve her right hip ext/abd strength to 4/5 MMT to improve ease of completing transfers. Eval: 2+/5  Pt will improve her right knee PROM to 0-120 deg to improve gait mechanics. Eval:  deg  Pt will report < 3/10 knee pain to improve QOL. Eval: 7/10  Pt will perform right LE SLS for at least 15 seconds to reduce fall risk.   Eval: NT    PLAN  []  Upgrade activities as tolerated     [x]  Continue plan of care  []  Update interventions per flow sheet       []  Discharge due to:_  []  Other:_      Christiano Lopez, PT 9/26/2022  3:17 PM    Future Appointments   Date Time Provider Dedra Calderon   9/28/2022  1:00 PM Keylakristofer Roejane, PTA [facility]   9/30/2022 12:15 PM Manuela Ramos, ARNP [facility]   10/3/2022  2:30 PM Juanda Crew, PT [facility]   10/5/2022 12:45 PM Juanda Crew, PT [facility]   10/7/2022 11:15 AM Jalyn Bake, PTA [facility]   10/10/2022 10:45 AM Juanda Crew, PT [facility]   10/12/2022 12:00 PM Juanda Crew, PT [facility]   10/14/2022 11:15 AM Juanda Crew, PT [facility]   10/17/2022 10:30 AM Jalyn Bake, PTA [facility]   10/19/2022 10:30 AM Juanda Crew, PT [facility]

## 2022-09-26 NOTE — PROGRESS NOTES
[facility]  [address]  [place], [address]  [phone] fax    Plan of Care/ Statement of Necessity for Physical Therapy Services    Patient name: Dorothy Bowser Start of Care: 2022   Referral source: Boris Durand MD : 1945    Medical Diagnosis: Right knee pain [M25.561]  Payor: [place] MEDICARE / Plan: [place] MEDICARE PART A & B / Product Type: Medicare /  Onset Date:2022    Treatment Diagnosis: right knee pain   Prior Hospitalization: see medical history Provider#: [id number]   Medications: Verified on Patient summary List    Comorbidities: arthritis, back pain, B hip pain, COPD, depression, diabetes, neurological disease, fibromyalgia, GI disease, sleep dysfunction   Prior Level of Function: pt reports multiple falls, at least once a week for the last year      The Plan of Care and following information is based on the information from the initial evaluation. Assessment/ key information: Pt is a 71-year-old woman presenting to the clinic s/p a right TKA performed on 2022. She presents today still wearing her ace wrap and surgical dressings underneath form fitting pants. She demonstrates fair AROM  and  deg of PROM. Other impairments include reduced B knee and hip strength, impaired balance, impaired ease of transfers and bed mobility, antalgic and unsteady gait with rollator, and swelling. Today, she measures 47.5cm of swelling over her dressings at the midpatella of the right knee and 40cm of the left knee. She expresses she has had a couple of near falls since the surgery due to pain. Signs and sx consistent with post-operative status and deconditioning. Pt will benefit from skilled PT services to address the aforementioned impairments and reduce fall risk.      Vasopnuematic compression justification:  Per bilateral girth measures taken and listed above the edema is considered significant and having an impact on the patient's strength, balance, gait, transfers, self care, and ADLs    Evaluation Complexity History HIGH Complexity :3+ comorbidities / personal factors will impact the outcome/ POC ; Examination LOW Complexity : 1-2 Standardized tests and measures addressing body structure, function, activity limitation and / or participation in recreation  ;Presentation MEDIUM Complexity : Evolving with changing characteristics  ; Clinical Decision Making HIGH Complexity : FOTO score of 1- 25   Overall Complexity Rating: MEDIUM  Problem List: pain affecting function, decrease ROM, decrease strength, edema affecting function, impaired gait/ balance, decrease ADL/ functional abilitiies, decrease activity tolerance, decrease flexibility/ joint mobility, and decrease transfer abilities   Treatment Plan may include any combination of the following: Therapeutic exercise, Therapeutic activities, Neuromuscular re-education, Physical agent/modality, Gait/balance training, Manual therapy, Aquatic therapy, Patient education, Self Care training, Functional mobility training, Home safety training, and Stair training  Patient / Family readiness to learn indicated by: asking questions, trying to perform skills, and interest  Persons(s) to be included in education: patient (P)  Barriers to Learning/Limitations: None  Patient Goal (s): I hope to gain full knee function  Patient Self Reported Health Status: good  Rehabilitation Potential: good    Short Term Goals: To be accomplished in 2 weeks:  Pt will report HEP compliance to maximize therapeutic outcome attainment. Pt will perform a STS from an 18 inch chair with BUE on her first try to improve independence with transfers. Long Term Goals: To be accomplished in 4 weeks:  Pt will improve her FOTO to 44, demonstrating improved functional ADL capacity. Pt will improve her right knee strength to 4/5 MMT to improve stability with stair negotiation.   Pt will improve her right hip ext/abd strength to 4/5 MMT to improve ease of completing transfers. Pt will improve her right knee PROM to 0-120 deg to improve gait mechanics. Pt will report < 3/10 knee pain to improve QOL. Pt will perform right LE SLS for at least 15 seconds to reduce fall risk. Frequency / Duration: Patient to be seen 2-3 times per week for 4 weeks. Patient/ Caregiver education and instruction: Diagnosis, prognosis, self care, activity modification, and exercises   [x]  Plan of care has been reviewed with PTA    Certification Period: 9/26/2022 -- 10/25/2022  Sis January, PT 9/26/2022 2:45 PM    ________________________________________________________________________    I certify that the above Therapy Services are being furnished while the patient is under my care. I agree with the treatment plan and certify that this therapy is necessary.     [de-identified] Signature:____________________  Date:____________Time: _________     Sharmaine Montano MD  Please sign and return to [facility] [address] [place] [address] & [facility] [address] [address]  [address] [place] [address] [facility] [address]  [phone] fax

## 2022-09-28 ENCOUNTER — HOSPITAL ENCOUNTER (OUTPATIENT)
Dept: PHYSICAL THERAPY | Age: 77
Discharge: HOME | End: 2022-09-28
Payer: MEDICARE

## 2022-09-28 PROCEDURE — 97016 VASOPNEUMATIC DEVICE THERAPY: CPT

## 2022-09-28 PROCEDURE — 97530 THERAPEUTIC ACTIVITIES: CPT

## 2022-09-28 PROCEDURE — 97112 NEUROMUSCULAR REEDUCATION: CPT

## 2022-09-28 PROCEDURE — 97110 THERAPEUTIC EXERCISES: CPT

## 2022-09-28 PROCEDURE — 97140 MANUAL THERAPY 1/> REGIONS: CPT

## 2022-09-28 NOTE — PROGRESS NOTES
PT DAILY TREATMENT NOTE     Patient Name: Charissa Courser  Date:2022  : 1945  [x]  Patient  Verified  Payor: VA MEDICARE / Plan: VA MEDICARE PART A & B / Product Type: Medicare /    In time:102  Out time:200  Total Treatment Time (min): 62  Visit #: 2 of     Medicare/BCBS Only   Total Timed Codes (min):  58 1:1 Treatment Time:  48       Treatment Area: Right knee pain [M25.561]    SUBJECTIVE  Pain Level (0-10 scale): 10  Any medication changes, allergies to medications, adverse drug reactions, diagnosis change, or new procedure performed?: [x] No    [] Yes (see summary sheet for update)  Subjective functional status/changes:   [] No changes reported  Patient noted severe pain in right knee and shin    OBJECTIVE         Modality rationale: decrease edema, decrease inflammation, and decrease pain to improve the patients ability to manage self care. Min Type Additional Details   10 [x]  Vasopneumatic Device    [x]  Right     []  Left  Pre-treatment girth: 44.5 cm  Post-treatment girth: 42.25 cm  Measured at (location): midpatella Pressure:       [x] lo [] med [] hi   Temperature: [x] lo [] med [] hi   [] Skin assessment post-treatment:  []intact []redness- no adverse reaction    []redness - adverse reaction:   20 min Therapeutic Exercise:  [] See flow sheet :   Rationale: increase ROM and increase strength to improve the patients ability to perform ADLs    18 min Therapeutic Activity:  []  See flow sheet : sit<>stand, standing weight shift with TKE on right   Rationale: increase strength and improve coordination  to improve the patients ability to perform ADLs     10 min Manual Therapy:  right knee tibial femoral mobs to increase extension. Edema massage and STM/DTM/MFR/TPR right pes anserine, poipliteus, lateral and distal quad and ITB. Patellar mobs and PROM with overpressure at end range knee flex and ext .    The manual therapy interventions were performed at a separate and distinct time from the therapeutic activities interventions. Rationale: decrease pain, increase ROM, increase tissue extensibility, and decrease edema  to perform ADLs      With   [] TE   [] TA   [] neuro   [] other: Patient Education: [x] Review HEP    [] Progressed/Changed HEP based on:   [] positioning   [] body mechanics   [] transfers   [] heat/ice application    [] other:      Other Objective/Functional Measures:   - initiated treatment  - mid patella pre GR 44.5 cm  - removed ACE bandage around knee over stocket and under pants as restricting mobility at knee and causing increased swelling in lower right leg  - mid tibia, anterior surface, tender bruise noted. Mariana Moore stated only painful when touched but instructed to monitor increased swelling, bruising and/or pain at rest and with amb. Any increase in sxs contact MD     Pain Level (0-10 scale) post treatment: 9    ASSESSMENT/Changes in Function: initated treatment per flow sheet and POC. Limited standing tolerance today; held therex per flow sheet. Attempt introducing exercises of full program NV per tolerance    Patient will continue to benefit from skilled PT services to modify and progress therapeutic interventions, address functional mobility deficits, address ROM deficits, address strength deficits, analyze and address soft tissue restrictions, and analyze and cue movement patterns to attain remaining goals. [x]  See Plan of Care  []  See progress note/recertification  []  See Discharge Summary         Progress towards goals / Updated goals:  Short Term Goals: To be accomplished in 2 weeks:  Pt will report HEP compliance to maximize therapeutic outcome attainment. Eval: HEP assigned  Pt will perform a STS from an 18 inch chair with BUE on her first try to improve independence with transfers. Eval: multiple start-ups with pain  Long Term Goals:  To be accomplished in 4 weeks:  Pt will improve her FOTO to 44, demonstrating improved functional ADL capacity. Eval: 22  Pt will improve her right knee strength to 4/5 MMT to improve stability with stair negotiation. Eval: 3/5  Pt will improve her right hip ext/abd strength to 4/5 MMT to improve ease of completing transfers. Eval: 2+/5  Pt will improve her right knee PROM to 0-120 deg to improve gait mechanics. Eval:  deg  Pt will report < 3/10 knee pain to improve QOL. Eval: 7/10  Pt will perform right LE SLS for at least 15 seconds to reduce fall risk.   Eval: NT    PLAN  []  Upgrade activities as tolerated     [x]  Continue plan of care  []  Update interventions per flow sheet       []  Discharge due to:_  []  Other:_      Lora Davies, PTA 9/28/2022  1:49 PM    Future Appointments   Date Time Provider Dedra Calderon   9/30/2022 12:15 PM Claude Sams [facility]   10/3/2022  2:30 PM Scientology Six, PT [facility]   10/5/2022 12:45 PM Scientology Six, PT [facility]   10/7/2022 11:15 AM Nic Covington, PTA [facility]   10/10/2022 10:45 AM Scientology Six, PT [facility]   10/12/2022 12:00 PM Scientology Six, PT [facility]   10/14/2022 11:15 AM Scientology Six, PT [facility]   10/17/2022 10:30 AM Nic Covington, PTA [facility]   10/19/2022 10:30 AM Scientology Six, PT [facility]

## 2022-09-30 ENCOUNTER — OFFICE VISIT (OUTPATIENT)
Dept: ORTHOPEDIC SURGERY | Age: 77
End: 2022-09-30
Payer: MEDICARE

## 2022-09-30 DIAGNOSIS — Z96.651 STATUS POST RIGHT KNEE REPLACEMENT: Primary | ICD-10-CM

## 2022-09-30 PROCEDURE — 99024 POSTOP FOLLOW-UP VISIT: CPT | Performed by: NURSE PRACTITIONER

## 2022-09-30 RX ORDER — OXYCODONE AND ACETAMINOPHEN 5; 325 MG/1; MG/1
1 TABLET ORAL
Qty: 30 TABLET | Refills: 0 | Status: SHIPPED | OUTPATIENT
Start: 2022-09-30 | End: 2022-10-14

## 2022-09-30 NOTE — PATIENT INSTRUCTIONS
Jiffy Knee Replacement: What to Expect at [address] [place] [address] Drive had a Jiffy Knee replacement. The doctor replaced the worn ends of the bones that connect to your knee (thighbone and lower leg bone) with plastic and metal parts. Your knee will continue to improve for up to a year. You will need to do weeks/months of physical rehabilitation (rehab) after a knee replacement. Rehab will help you strengthen the muscles of the knee and help you regain movement. After you recover, your artificial knee will allow you to do normal daily activities with less pain or no pain at all. You may be able to hike, dance, or ride a bike. Talk to your doctor about whether you can do more strenuous activities. Always tell your caregivers that you have an artificial knee. How long it will take to walk on your own, return to normal activities, and go back to work depends on your health and how well your rehabilitation (rehab) program goes. The better you do with your rehab exercises, the quicker you will get your strength and movement back. This care sheet gives you a general idea about how long it will take for you to recover. But each person recovers at a different pace. Follow the steps below to get better as quickly as possible. How can you care for yourself at home? Activity    You will be participating in an outpatient physical therapy program. Your goal is progress from a walker to a cane to nothing at all while walking. You should be doing your home exercises 3-4 times daily. The therapist will determine when you are ready to stop the physical therapy program.     If you require a work note, please call our office when you are ready to go back to work. You may drive when you are no longer using a walker or daytime narcotic pain medications. Some clicking or clunking in the knee is normal.     If you have any traumas or falls, contact our office immediately.      Some bruising in the leg and foot is normal.     If you experience any significant calf pain or swelling or shortness of breath, please call Dr. Justin Christianson or go to the ER if it is urgent. Diet    Drink plenty of fluids (unless your doctor tells you not to). You may notice that your bowel movements are not regular right after your surgery. This is common. Try to avoid constipation and straining with bowel movements. Drinking enough fluids, taking a stool softener, and eating foods that are good sources of fiber can help you avoid constipation. If you have not had a bowel movement after a couple of days, talk to your doctor. Medicines    You should take aspirin 325 mg twice daily until you are 1 month out from surgery. If you take a prescription blood thinner, continue that as directed. If you think your pain medicine is making you sick to your stomach: Take your medicine after meals (unless your doctor has told you not to). Take the prescribed nausea pills as directed. Ask your doctor for a different pain medicine. If your doctor prescribed antibiotics, take them as directed. If you require a refill on narcotic pain medication, please let us know at the time of today's appointment or give at least 2 business days for refill for future dates. Incision care    You can shower and get your incision wet with soap and water. Pat it dry and no further dressing changes will be required. You will see a clear surgical mesh tape on your knee. You may remove that tape two weeks after the surgery. If you notice any redness around the incision site or fluid from the knee incision, call Dr. Berman [place] office immediately. Ice    For pain and swelling, put ice or a cold pack on the area for 10 to 20 minutes at a time. Put a thin cloth between the ice and your skin. If your doctor recommended cold therapy using a portable machine, follow the instructions that came with the machine.    Other instructions    Wear compression stockings if your doctor told you to. These may help to prevent blood clots. Your doctor will tell you how long you need to keep wearing the compression stockings. If you have any procedures or dental work where there may be bleeding, it is recommended that you get antibiotics to take 1 hour prior to that procedure. Please call our office at least 2 business ahead of the procedure. Follow-up care is a key part of your treatment and safety. Be sure to make and go to all appointments, and call your doctor if you are having problems. It's also a good idea to know your test results and keep a list of the medicines you take. When should you call for help? Call 911 anytime you think you may need emergency care. For example, call if:    You passed out (lost consciousness). You have severe trouble breathing. You have sudden chest pain and shortness of breath, or you cough up blood. Call your doctor now or seek immediate medical care if:    You have signs of infection, such as: Increased pain, swelling, warmth, or redness. Red streaks leading from the incision. Pus draining from the incision. A fever. 2. You have signs of a blood clot, such as:  Pain in your calf, back of the knee, thigh, or groin. Redness and swelling in your leg or groin. Your incision comes open and begins to bleed, or the bleeding increases. You have pain that does not get better after you take pain medicine. Watch closely for changes in your health, and be sure to contact your doctor if:    You do not have a bowel movement after taking a laxative. Where can you learn more? Go to http://www.gray.com/  Enter T054 in the search box to learn more about \"Total Knee Replacement: What to Expect at Home. \"  Current as of: July 1, 2021               Content Version: 13.2  © 8693-8760 [employer].    Care instructions adapted under license by [employer] (which disclaims liability or warranty for this information). If you have questions about a medical condition or this instruction, always ask your healthcare professional. [place] [address] any warranty or liability for your use of this information.

## 2022-09-30 NOTE — PROGRESS NOTES
Subjective:      Patient presents for postop care following right TKA. Surgery was on 9/23/2022. Ambulating with a walker. Pain is controlled with current analgesics. Medication(s) being used: Percocet. Objective: There were no vitals taken for this visit. General:  alert, cooperative, no distress, appears stated age   ROM: -5/95; good quad strength on extension   Incision:   healing well, no drainage, no erythema, incision well approximated, mild swelling     Assessment:     Doing well postoperatively. Plan:     1. Continue PT. 2. Wound care/showering discussed. 3. Continue DVT prophylaxis as directed. 4. Follow up at 1 yr with Dr. Shalonda Jeffery for xray's of the right knee and as needed. Nicolas Perla

## 2022-10-03 ENCOUNTER — HOSPITAL ENCOUNTER (OUTPATIENT)
Dept: PHYSICAL THERAPY | Age: 77
Discharge: HOME | End: 2022-10-03
Payer: MEDICARE

## 2022-10-03 PROCEDURE — 97140 MANUAL THERAPY 1/> REGIONS: CPT

## 2022-10-03 PROCEDURE — 97110 THERAPEUTIC EXERCISES: CPT

## 2022-10-03 PROCEDURE — 97016 VASOPNEUMATIC DEVICE THERAPY: CPT

## 2022-10-03 NOTE — PROGRESS NOTES
PT DAILY TREATMENT NOTE     Patient Name: Jonathan Ortiz  Date:10/3/2022  : 1945  [x]  Patient  Verified  Payor: [place] MEDICARE / Plan: [place] MEDICARE PART A & B / Product Type: Medicare /    In time:244pm  Out time:330pm  Total Treatment Time (min): 55  Visit #: 3 of     Medicare/BCBS Only   Total Timed Codes (min):  36 1:1 Treatment Time:  30       Treatment Area: Right knee pain [M25.561]    SUBJECTIVE   Pain Level (0-10 scale): 6  Any medication changes, allergies to medications, adverse drug reactions, diagnosis change, or new procedure performed?: [x] No    [] Yes (see summary sheet for update)  Subjective functional status/changes:   [] No changes reported  Reports HEP compliance. Arrives 14 minutes late. Amb without SPC \"with a limp\" to try to challenge self. OBJECTIVE    Modality rationale: decrease edema, decrease inflammation, and decrease pain to improve the patients ability to manage self care. Min Type Additional Details   10 [x]  Vasopneumatic Device    [x]  Right     []  Left  Pre-treatment girth: 42cm   Post-treatment girth: 40cm  Measured at (location): midpatella Pressure:       [x] lo [] med [] hi   Temperature: [x] lo [] med [] hi   [] Skin assessment post-treatment:  []intact []redness- no adverse reaction    []redness - adverse reaction:     22 min Therapeutic Exercise:  [x] See flow sheet :   Rationale: increase ROM and increase strength to improve the patients ability to manage ADLs with reduced pain. 8 min Manual Therapy:  STM popliteus, medial/lateral HS, and distal ITB: GR II/III superior and inferior patellar mobs; GR II/III tib-fem extension mobs followed by pulse OP stretch into terminal knee extension. The manual therapy interventions were performed at a separate and distinct time from the therapeutic activities interventions. Rationale: decrease pain, increase ROM, and increase tissue extensibility to improve ease of amb.            With   [] TE [] TA   [] neuro   [] other: Patient Education: [x] Review HEP    [] Progressed/Changed HEP based on:   [] positioning   [] body mechanics   [] transfers   [] heat/ice application    [] other:      Other Objective/Functional Measures: 130 deg right knee flexion with heel slides. Pain Level (0-10 scale) post treatment: 8    ASSESSMENT/Changes in Function: Advocated for pt to use SPC to improve stability and ease of amb. Demonstrates great progress with knee flexion, attaining full flexion ROM, and is able to perform STS from 19\" plinth with hands posted on knees with ease. Her patella continues to be the source of most of her pain. Patient will continue to benefit from skilled PT services to modify and progress therapeutic interventions, address functional mobility deficits, address ROM deficits, address strength deficits, analyze and address soft tissue restrictions, analyze and cue movement patterns, analyze and modify body mechanics/ergonomics, assess and modify postural abnormalities, address imbalance/dizziness, and instruct in home and community integration to attain remaining goals. []  See Plan of Care  []  See progress note/recertification  []  See Discharge Summary         Progress towards goals / Updated goals:  Short Term Goals: To be accomplished in 2 weeks:  Pt will report HEP compliance to maximize therapeutic outcome attainment. Eval: HEP assigned  Current: met. (10/3/2022)  Pt will perform a STS from an 18 inch chair with BUE on her first try to improve independence with transfers. Eval: multiple start-ups with pain  Current: met (10/3/2022)  Long Term Goals: To be accomplished in 4 weeks:  Pt will improve her FOTO to 44, demonstrating improved functional ADL capacity. Eval: 22  Pt will improve her right knee strength to 4/5 MMT to improve stability with stair negotiation. Eval: 3/5  Pt will improve her right hip ext/abd strength to 4/5 MMT to improve ease of completing transfers. Eval: 2+/5  Pt will improve her right knee PROM to 0-120 deg to improve gait mechanics. Eval:  deg  Pt will report < 3/10 knee pain to improve QOL. Eval: 7/10  Pt will perform right LE SLS for at least 15 seconds to reduce fall risk.   Eval: NT    PLAN  []  Upgrade activities as tolerated     [x]  Continue plan of care  []  Update interventions per flow sheet       []  Discharge due to:_  []  Other:_       Lily Chester, PT 10/3/2022  2:47 PM    Future Appointments   Date Time Provider Dedra Calderon   10/5/2022 12:45 PM Dayana Rast, PT [facility]   10/7/2022 11:15 AM Saul Gordillo, PTA [facility]   10/10/2022 10:45 AM Dayana Rast, PT [facility]   10/12/2022 12:00 PM Dayana Nel, PT [facility]   10/14/2022 11:15 AM Dayana Rast, PT [facility]   10/17/2022 10:30 AM Carolynyleehsan Gordillo, PTA [facility]   10/19/2022 10:30 AM Dayana Rast, PT [facility]

## 2022-10-05 ENCOUNTER — TELEPHONE (OUTPATIENT)
Dept: PHYSICAL THERAPY | Age: 77
End: 2022-10-05

## 2022-10-05 ENCOUNTER — APPOINTMENT (OUTPATIENT)
Dept: PHYSICAL THERAPY | Age: 77
End: 2022-10-05
Payer: MEDICARE

## 2022-10-10 ENCOUNTER — HOSPITAL ENCOUNTER (OUTPATIENT)
Dept: PHYSICAL THERAPY | Age: 77
Discharge: HOME | End: 2022-10-10
Payer: MEDICARE

## 2022-10-10 PROCEDURE — 97140 MANUAL THERAPY 1/> REGIONS: CPT

## 2022-10-10 PROCEDURE — 97110 THERAPEUTIC EXERCISES: CPT

## 2022-10-10 PROCEDURE — 97016 VASOPNEUMATIC DEVICE THERAPY: CPT

## 2022-10-10 NOTE — PROGRESS NOTES
PT DAILY TREATMENT NOTE     Patient Name: Tiffany Sheridan  Date:10/10/2022  : 1945  [x]  Patient  Verified  Payor: [place] MEDICARE / Plan: [place] MEDICARE PART A & B / Product Type: Medicare /    In time:1040AM (early)  Out time:1130am  Total Treatment Time (min): 50  Visit #: 4 of     Medicare/BCBS Only   Total Timed Codes (min):  50 1:1 Treatment Time:  40       Treatment Area: Right knee pain [M25.561]    SUBJECTIVE  Pain Level (0-10 scale): 5  Any medication changes, allergies to medications, adverse drug reactions, diagnosis change, or new procedure performed?: [x] No    [] Yes (see summary sheet for update)  Subjective functional status/changes:   [] No changes reported  Apologizes for missing last visit due to writing down wrong time. Arrives to therapy without AD. OBJECTIVE    Modality rationale: decrease edema, decrease inflammation, and decrease pain to improve the patients ability to manage ADLs. Min Type Additional Details    []  Ice     []  heat  []  Ice massage  []  Laser   []  Anodyne Position:  Location:   10 [x]  Vasopneumatic Device    [x]  Right     []  Left  Pre-treatment girth:40.5cm  Post-treatment girth:40cm  Measured at (location): midpatella Pressure:       [x] lo [] med [] hi   Temperature: [x] lo [] med [] hi   [] Skin assessment post-treatment:  []intact []redness- no adverse reaction    []redness - adverse reaction:     Vasopnuematic compression justification:  Per bilateral girth measures taken and listed above the edema is considered significant and having an impact on the patient's strength, balance, gait, transfers, self care, and ADLs    32 min Therapeutic Exercise:  [x] See flow sheet :   Rationale: increase ROM and increase strength to improve the patients ability to manage ADLs with reduced pain.       8 min Manual Therapy:  STM popliteus, medial/lateral HS, and distal ITB: GR II/III superior and inferior patellar mobs; GR II/III tib-fem extension mobs followed by pulse OP stretch into terminal knee extension. The manual therapy interventions were performed at a separate and distinct time from the therapeutic activities interventions. Rationale: decrease pain, increase ROM, and increase tissue extensibility to improve ease of amb. With   [] TE   [] TA   [] neuro   [] other: Patient Education: [x] Review HEP    [] Progressed/Changed HEP based on:   [] positioning   [] body mechanics   [] transfers   [] heat/ice application    [] other:      Other Objective/Functional Measures: knee ROM 0-5-120 deg     Pain Level (0-10 scale) post treatment: 6    ASSESSMENT/Changes in Function: Pt demonstrates continued swelling and reduced patellar mobility, causing pain to refer to the patellar tendon. Advised pt to continue with ice and elevation as well as performing knee AROM throughout the day to help glide the patella. She demonstrates much improved knee flexion and would be able to achieve TKE with overpressure but is unable to tolerate the stretch. Patient will continue to benefit from skilled PT services to modify and progress therapeutic interventions, address functional mobility deficits, address ROM deficits, address strength deficits, analyze and address soft tissue restrictions, analyze and cue movement patterns, analyze and modify body mechanics/ergonomics, assess and modify postural abnormalities, address imbalance/dizziness, and instruct in home and community integration to attain remaining goals. []  See Plan of Care  []  See progress note/recertification  []  See Discharge Summary         Progress towards goals / Updated goals:  Short Term Goals: To be accomplished in 2 weeks:  Pt will report HEP compliance to maximize therapeutic outcome attainment. Eval: HEP assigned  Current: met. (10/3/2022)  Pt will perform a STS from an 18 inch chair with BUE on her first try to improve independence with transfers.   Eval: multiple start-ups with pain  Current: met (10/3/2022)  Long Term Goals: To be accomplished in 4 weeks:  Pt will improve her FOTO to 44, demonstrating improved functional ADL capacity. Eval: 22  Pt will improve her right knee strength to 4/5 MMT to improve stability with stair negotiation. Eval: 3/5  Pt will improve her right hip ext/abd strength to 4/5 MMT to improve ease of completing transfers. Eval: 2+/5  Pt will improve her right knee PROM to 0-120 deg to improve gait mechanics. Eval:  deg    Current: 5-120 deg (10/10/2022)  Pt will report < 3/10 knee pain to improve QOL. Eval: 7/10  Pt will perform right LE SLS for at least 15 seconds to reduce fall risk.   Eval: NT  Current: 2 seconds, pain (10/10/2022)    PLAN  []  Upgrade activities as tolerated     [x]  Continue plan of care  []  Update interventions per flow sheet       []  Discharge due to:_  []  Other:_      Maria E Hernandez, PT 10/10/2022  10:39 AM    Future Appointments   Date Time Provider Dedra Calderon   10/10/2022 10:45 AM Vidal Katz PT [facility]   10/12/2022 12:00 PM Vidal Katz PT [facility]   10/14/2022 11:15 AM Vidal Katz PT [facility]   10/17/2022 10:30 AM Neema Kim PTA [facility]   10/19/2022 10:30 AM Vidal Katz PT [facility]

## 2022-10-11 ENCOUNTER — TRANSCRIBE ORDER (OUTPATIENT)
Dept: SCHEDULING | Age: 77
End: 2022-10-11

## 2022-10-11 DIAGNOSIS — Z12.31 VISIT FOR SCREENING MAMMOGRAM: Primary | ICD-10-CM

## 2022-10-12 ENCOUNTER — HOSPITAL ENCOUNTER (OUTPATIENT)
Dept: PHYSICAL THERAPY | Age: 77
Discharge: HOME | End: 2022-10-12
Payer: MEDICARE

## 2022-10-12 PROCEDURE — 97110 THERAPEUTIC EXERCISES: CPT

## 2022-10-12 PROCEDURE — 97016 VASOPNEUMATIC DEVICE THERAPY: CPT

## 2022-10-12 PROCEDURE — 97140 MANUAL THERAPY 1/> REGIONS: CPT

## 2022-10-12 NOTE — PROGRESS NOTES
PT DAILY TREATMENT NOTE     Patient Name: Adoer Kwong  Date:10/12/2022  : 1945  [x]  Patient  Verified  Payor: [place] MEDICARE / Plan: [place] MEDICARE PART A & B / Product Type: Medicare /    In time:12pm  Out time:1255pm  Total Treatment Time (min): 55  Visit #: 5 of     Medicare/BCBS Only   Total Timed Codes (min):  55 1:1 Treatment Time:  40       Treatment Area: Right knee pain [M25.561]    SUBJECTIVE  Pain Level (0-10 scale): 7  Any medication changes, allergies to medications, adverse drug reactions, diagnosis change, or new procedure performed?: [x] No    [] Yes (see summary sheet for update)  Subjective functional status/changes:   [] No changes reported  Increased soreness along the incision following therapist's overpressure stretch into extension. Requests more exercises for home because she feels uneven only exercising her right leg. OBJECTIVE    Modality rationale: decrease edema, decrease inflammation, and decrease pain to improve the patients ability to manage ADLs. Min Type Additional Details   10 [x]  Vasopneumatic Device    [x]  Right     []  Left  Pre-treatment girth:41cm  Post-treatment girth:40.5cm  Measured at (location): midpatella Pressure:       [x] lo [] med [] hi   Temperature: [x] lo [] med [] hi   [] Skin assessment post-treatment:  []intact []redness- no adverse reaction    []redness - adverse reaction:     Vasopnuematic compression justification:  Per bilateral girth measures taken and listed above the edema is considered significant and having an impact on the patient's strength, balance, gait, transfers, self care, and ADLs     32 min Therapeutic Exercise:  [x] See flow sheet :   Rationale: increase ROM and increase strength to improve the patients ability to manage ADLs with reduced pain.       8 min Manual Therapy:  STM popliteus, medial/lateral HS, and distal ITB: GR II/III superior and inferior patellar mobs; GR II/III tib-fem extension mobs followed by pulse OP stretch into terminal knee extension. The manual therapy interventions were performed at a separate and distinct time from the therapeutic activities interventions. Rationale: decrease pain, increase ROM, and increase tissue extensibility to improve ease of amb. With   [] TE   [] TA   [] neuro   [] other: Patient Education: [x] Review HEP    [] Progressed/Changed HEP based on:   [] positioning   [] body mechanics   [] transfers   [] heat/ice application    [] other:      Other Objective/Functional Measures: added supine knee extension towel prop stretch for 2 minutes. Pain Level (0-10 scale) post treatment: 6-7    ASSESSMENT/Changes in Function: Provided pt with a prn HEP to perform for BLE as tolerated. Emphasized importance of knee extension stretching and AROM into knee flexion and extension to improve patellar glide and reduce patellar pain with knee extension. Poor tolerance to knee extension stretching due to anterior knee pain. Patient will continue to benefit from skilled PT services to modify and progress therapeutic interventions, address functional mobility deficits, address ROM deficits, address strength deficits, analyze and address soft tissue restrictions, analyze and cue movement patterns, analyze and modify body mechanics/ergonomics, assess and modify postural abnormalities, address imbalance/dizziness, and instruct in home and community integration to attain remaining goals. []  See Plan of Care  []  See progress note/recertification  []  See Discharge Summary         Progress towards goals / Updated goals:  Short Term Goals: To be accomplished in 2 weeks:  Pt will report HEP compliance to maximize therapeutic outcome attainment. Eval: HEP assigned  Current: met. (10/3/2022)  Pt will perform a STS from an 18 inch chair with BUE on her first try to improve independence with transfers.   Eval: multiple start-ups with pain  Current: met (10/3/2022)  Long Term Goals: To be accomplished in 4 weeks:  Pt will improve her FOTO to 44, demonstrating improved functional ADL capacity. Eval: 22  Pt will improve her right knee strength to 4/5 MMT to improve stability with stair negotiation. Eval: 3/5  Pt will improve her right hip ext/abd strength to 4/5 MMT to improve ease of completing transfers. Eval: 2+/5  Pt will improve her right knee PROM to 0-120 deg to improve gait mechanics. Eval:  deg    Current: 5-120 deg (10/10/2022)  Pt will report < 3/10 knee pain to improve QOL. Eval: 7/10  Pt will perform right LE SLS for at least 15 seconds to reduce fall risk.   Eval: NT  Current: 2 seconds, pain (10/10/2022)    PLAN  []  Upgrade activities as tolerated     [x]  Continue plan of care  []  Update interventions per flow sheet       []  Discharge due to:_  []  Other:_      Cara Padilla, PT 10/12/2022  11:38 AM    Future Appointments   Date Time Provider Dedra Calderon   10/12/2022 12:00 PM Orthodoxy Six, PT [facility]   10/14/2022 11:15 AM Orthodoxy Six, PT [facility]   10/17/2022 10:30 AM Nic Covington, PTA [facility]   10/19/2022 10:30 AM Orthodoxy Six, PT [facility]   10/26/2022 12:00 PM [facility] RM 1 2D [facility]

## 2022-10-17 ENCOUNTER — HOSPITAL ENCOUNTER (OUTPATIENT)
Dept: PHYSICAL THERAPY | Age: 77
Discharge: HOME | End: 2022-10-17
Payer: MEDICARE

## 2022-10-17 PROCEDURE — 97110 THERAPEUTIC EXERCISES: CPT

## 2022-10-17 PROCEDURE — 97140 MANUAL THERAPY 1/> REGIONS: CPT

## 2022-10-17 PROCEDURE — 97112 NEUROMUSCULAR REEDUCATION: CPT

## 2022-10-17 NOTE — PROGRESS NOTES
PT DAILY TREATMENT NOTE     Patient Name: Elizabeth Mendoza  Date:10/17/2022  : 1945  [x]  Patient  Verified  Payor: [place] MEDICARE / Plan: [place] MEDICARE PART A & B / Product Type: Medicare /    In time:10:30  Out time:11:30  Total Treatment Time (min): 60  Visit #: 6 of 8    Medicare/BCBS Only   Total Timed Codes (min):  50 1:1 Treatment Time:  50       Treatment Area: Right knee pain [M25.561]    SUBJECTIVE  Pain Level (0-10 scale): 5/10  Any medication changes, allergies to medications, adverse drug reactions, diagnosis change, or new procedure performed?: [x] No    [] Yes (see summary sheet for update)  Subjective functional status/changes:   [] No changes reported  Pt reports her pain is better today. Pt states she walked around more than usual over the weekend and felt good. OBJECTIVE    32 min Therapeutic Exercise:  [x] See flow sheet :   Rationale: increase ROM and increase strength to improve the patients ability to perform ADLs with increased ease. 10 min Neuromuscular Re-education:  [x]  See flow sheet :   Rationale: increase strength, improve coordination, and increase proprioception  to improve the patients ability to perform ADls with increased ease. 8 min Manual Therapy:  PROM to right knee. Patellar mobs inferior/superior grade II-III   The manual therapy interventions were performed at a separate and distinct time from the therapeutic activities interventions. Rationale: decrease pain, increase ROM, and increase tissue extensibility to perform ADLs with increased ease. With   [] TE   [] TA   [] neuro   [] other: Patient Education: [x] Review HEP    [] Progressed/Changed HEP based on:   [] positioning   [] body mechanics   [] transfers   [] heat/ice application    [] other:      Other Objective/Functional Measures: AROM right knee 135 degrees.       Pain Level (0-10 scale) post treatment: 0/10    ASSESSMENT/Changes in Function: Pt demonstrates good right knee AROM but continues to have limited TKE. Pt continues to demonstrate improved functional mobility of right LE. Patient will continue to benefit from skilled PT services to modify and progress therapeutic interventions, address functional mobility deficits, address ROM deficits, address strength deficits, analyze and address soft tissue restrictions, analyze and cue movement patterns, analyze and modify body mechanics/ergonomics, and assess and modify postural abnormalities to attain remaining goals. []  See Plan of Care  []  See progress note/recertification  []  See Discharge Summary         Progress towards goals / Updated goals:  Short Term Goals: To be accomplished in 2 weeks:  Pt will report HEP compliance to maximize therapeutic outcome attainment. Eval: HEP assigned  Current: met. (10/3/2022)  Pt will perform a STS from an 18 inch chair with BUE on her first try to improve independence with transfers. Eval: multiple start-ups with pain  Current: met (10/3/2022)  Long Term Goals: To be accomplished in 4 weeks:  Pt will improve her FOTO to 44, demonstrating improved functional ADL capacity. Eval: 22  Pt will improve her right knee strength to 4/5 MMT to improve stability with stair negotiation. Eval: 3/5  Pt will improve her right hip ext/abd strength to 4/5 MMT to improve ease of completing transfers. Eval: 2+/5  Pt will improve her right knee PROM to 0-120 deg to improve gait mechanics. Eval:  deg    Current: 5-120 deg (10/10/2022); progressed to 5-135 degrees. 10/17/2022  Pt will report < 3/10 knee pain to improve QOL. Eval: 7/10  Pt will perform right LE SLS for at least 15 seconds to reduce fall risk.   Eval: NT  Current: 2 seconds, pain (10/10/2022)     PLAN  []  Upgrade activities as tolerated     [x]  Continue plan of care  []  Update interventions per flow sheet       []  Discharge due to:_  []  Other:_      Faiza Duran PTA 10/17/2022  10:29 AM    Future Appointments   Date Time Provider [facility]   10/17/2022 10:30 AM Sonam Han, PTA [facility]   10/19/2022 10:30 AM Guilherme Brown, PT [facility]   10/26/2022 12:00 PM [facility] RM 1 2D [facility]

## 2022-10-19 ENCOUNTER — HOSPITAL ENCOUNTER (OUTPATIENT)
Dept: PHYSICAL THERAPY | Age: 77
Discharge: HOME | End: 2022-10-19
Payer: MEDICARE

## 2022-10-19 PROCEDURE — 97110 THERAPEUTIC EXERCISES: CPT

## 2022-10-19 PROCEDURE — 97016 VASOPNEUMATIC DEVICE THERAPY: CPT

## 2022-10-19 PROCEDURE — 97140 MANUAL THERAPY 1/> REGIONS: CPT

## 2022-10-19 NOTE — PROGRESS NOTES
PT DAILY TREATMENT NOTE     Patient Name: Arely Sinclair  Date:10/19/2022  : 1945  [x]  Patient  Verified  Payor: VA MEDICARE / Plan: VA MEDICARE PART A & B / Product Type: Medicare /    In time:12pm  Out time:1255pm  Total Treatment Time (min): 54  Visit #: 7 of 8    Medicare/BCBS Only   Total Timed Codes (min):  55 1:1 Treatment Time:  40       Treatment Area: Right knee pain [M25.561]    SUBJECTIVE  Pain Level (0-10 scale): 4  Any medication changes, allergies to medications, adverse drug reactions, diagnosis change, or new procedure performed?: [x] No    [] Yes (see summary sheet for update)  Subjective functional status/changes:   [] No changes reported  Reports some medial knee pain elevation after last session that is still present today but at a lesser degree. OBJECTIVE    Modality rationale: decrease edema, decrease inflammation, and decrease pain to improve the patients ability to manage self care.    Min Type Additional Details    [] Estim:  []Unatt       []IFC  []Premod                        []Other:  []w/ice   []w/heat  Position:  Location:    [] Estim: []Att    []TENS instruct  []NMES                    []Other:  []w/US   []w/ice   []w/heat  Position:  Location:    []  Traction: [] Cervical       []Lumbar                       [] Prone          []Supine                       []Intermittent   []Continuous Lbs:  [] before manual  [] after manual    []  Ultrasound: []Continuous   [] Pulsed                           []1MHz   []3MHz W/cm2:  Location:    []  Iontophoresis with dexamethasone         Location: [] Take home patch   [] In clinic    []  Ice     []  heat  []  Ice massage  []  Laser   []  Anodyne Position:  Location:    []  Laser with stim  []  Other:  Position:  Location:   10 [x]  Vasopneumatic Device    [x]  Right     []  Left  Pre-treatment girth:39cm  Post-treatment girth:38.5cm  Measured at (location):  right knee Pressure:       [x] lo [] med [] hi   Temperature: [x] lo [] med [] hi   [] Skin assessment post-treatment:  []intact []redness- no adverse reaction    []redness - adverse reaction:     Vasopnuematic compression justification:  Per bilateral girth measures taken and listed above the edema is considered significant and having an impact on the patient's strength, balance, gait, transfers, self care, and ADLs     32 min Therapeutic Exercise:  [x] See flow sheet :   Rationale: increase ROM and increase strength to improve the patients ability to manage ADLs with reduced pain. 8 min Manual Therapy:  STM popliteus, medial/lateral HS, and distal ITB: GR II/III superior and inferior patellar mobs; GR II/III tib-fem extension mobs followed by pulse OP stretch into terminal knee extension. The manual therapy interventions were performed at a separate and distinct time from the therapeutic activities interventions. Rationale: decrease pain, increase ROM, and increase tissue extensibility to improve ease of amb. With   [] TE   [] TA   [] neuro   [] other: Patient Education: [x] Review HEP    [] Progressed/Changed HEP based on:   [] positioning   [] body mechanics   [] transfers   [] heat/ice application    [] other:      Other Objective/Functional Measures: see PN     Pain Level (0-10 scale) post treatment: 4    ASSESSMENT/Changes in Function: see PN    Patient will continue to benefit from skilled PT services to modify and progress therapeutic interventions, address functional mobility deficits, address ROM deficits, address strength deficits, analyze and address soft tissue restrictions, analyze and cue movement patterns, analyze and modify body mechanics/ergonomics, assess and modify postural abnormalities, address imbalance/dizziness, and instruct in home and community integration to attain remaining goals.      []  See Plan of Care  []  See progress note/recertification  []  See Discharge Summary         Progress towards goals / Updated goals:  Short Term Goals: To be accomplished in 2 weeks:  Pt will report HEP compliance to maximize therapeutic outcome attainment. Eval: HEP assigned  Current: met. (10/3/2022)  Pt will perform a STS from an 18 inch chair with BUE on her first try to improve independence with transfers. Eval: multiple start-ups with pain  Current: met (10/3/2022)  Long Term Goals: To be accomplished in 4 weeks:  Pt will improve her FOTO to 44, demonstrating improved functional ADL capacity. Eval: 22  Current: met, 60 (10/19/2022)   Pt will improve her right knee strength to 4/5 MMT to improve stability with stair negotiation. Eval: 3/5  Current: progressing, 4-/5 quad and HS (10/19/2022)  Pt will improve her right hip ext/abd strength to 4/5 MMT to improve ease of completing transfers. Eval: 2+/5   Current: slow progress, hip ext 2+/5, hip abd 3-/5 (10/19/2022)  Pt will improve her right knee PROM to 0-120 deg to improve gait mechanics. Eval:  deg    Current: 5-120 deg (10/10/2022); progressed to 5-135 degrees. 10/17/2022  Pt will report < 3/10 knee pain to improve QOL. Eval: 7/10  Current: progressing, on average 5/10 pain (10/19/2022)  Pt will perform right LE SLS for at least 15 seconds to reduce fall risk.   Eval: NT  Current: 2 seconds, pain (10/10/2022)    PLAN  []  Upgrade activities as tolerated     [x]  Continue plan of care  []  Update interventions per flow sheet       []  Discharge due to:_  []  Other:_      Louisa Luke, PT 10/19/2022  12:07 PM    Future Appointments   Date Time Provider Dedra Calderon   10/26/2022 12:00 PM [facility] RM 1 2D [facility]

## 2022-10-20 NOTE — PROGRESS NOTES
[facility]  [address]  [place], [address]  [phone] fax    Continued Plan of Care/ Re-certification for Physical Therapy Services    Patient name: Elizabeth Mendoza Start of Care: 2022   Referral source: Martine Ni MD : 1945   Medical/Treatment Diagnosis: Right knee pain [M25.561]  Payor: VA MEDICARE / Plan: VA MEDICARE PART A & B / Product Type: Medicare /  Onset Date:2022     Prior Hospitalization: see medical history Provider#: [id number]   Medications: Verified on Patient Summary List    Comorbidities: arthritis, back pain, B hip pain, COPD, depression, diabetes, neurological disease, fibromyalgia, GI disease, sleep dysfunction   Prior Level of Function: pt reports multiple falls, at least once a week for the last year  Visits from Start of Care: 7    Missed Visits: 2    The Plan of Care and following information is based on the patient's current status:  Short Term Goals: To be accomplished in 2 weeks:  Pt will report HEP compliance to maximize therapeutic outcome attainment. Eval: HEP assigned  Current: met. Pt will perform a STS from an 18 inch chair with BUE on her first try to improve independence with transfers. Eval: multiple start-ups with pain  Current: met   Long Term Goals: To be accomplished in 4 weeks:  Pt will improve her FOTO to 44, demonstrating improved functional ADL capacity. Eval: 22  Current: met, 61   Pt will improve her right knee strength to 4/5 MMT to improve stability with stair negotiation. Eval: 3/5  Current: progressing, 4-/5 quad and HS   Pt will improve her right hip ext/abd strength to 4/5 MMT to improve ease of completing transfers. Eval: 2+/5   Current: slow progress, hip ext 2+/5, hip abd 3-/5   Pt will improve her right knee PROM to 0-120 deg to improve gait mechanics. Eval:  deg    Current: progressed to 5-135 degrees.    Pt will report < 3/10 knee pain to improve QOL.  Eval: 7/10  Current: progressing, on average 5/10 pain   Pt will perform right LE SLS for at least 15 seconds to reduce fall risk. Eval: NT  Current: 2 seconds, pain     Key functional changes: ROM 5-135 deg      Problems/ barriers to goal attainment: NA     Problem List: pain affecting function, decrease ROM, decrease strength, edema affecting function, impaired gait/ balance, decrease ADL/ functional abilitiies, decrease activity tolerance, decrease flexibility/ joint mobility, and decrease transfer abilities    Treatment Plan: Therapeutic exercise, Neuromuscular reeducation, Manual therapy, Therapeutic activity, Self care/home management, Vasopneumatic device, and Gait training     Patient Goal (s) has been updated and includes: no pain     Goals for this certification period to be accomplished in 4 weeks:  Pt will improve her right knee strength to 4/5 MMT to improve stability with stair negotiation. Re-cert: progressing, 4-/5 quad and HS   2. Pt will improve her right hip ext/abd strength to 4/5 MMT to improve ease of completing transfers. Re-cert: slow progress, hip ext 2+/5, hip abd 3-/5   3. Pt will improve her right knee PROM to 0-120 deg to improve gait mechanics. Re-cert: progressed to 7-389 degrees. 4. Pt will report < 3/10 knee pain to improve QOL. Re-cert: progressing, on average 5/10 pain   5. Pt will perform right LE SLS for at least 15 seconds to reduce fall risk. Re-cert: 2 seconds, pain, 30 second with finger touch assist    Frequency / Duration: Patient to be seen 2 times per week for 4 weeks:    Assessment / Recommendations:Pt demonstrates improving knee ROM, knee strength, hip strength, and tolerance to single limb stability. She continues to lack TKE and attests to medial anterior knee pain that lingers. Reduced patellar mobility noted with some continued swelling.  Patient will continue to benefit from skilled PT services to modify and progress therapeutic interventions, address functional mobility deficits, address ROM deficits, address strength deficits, analyze and address soft tissue restrictions, analyze and cue movement patterns, analyze and modify body mechanics/ergonomics, assess and modify postural abnormalities, address imbalance/dizziness, and instruct in home and community integration to attain remaining goals. Certification Period: 10/24/2022 -- 11/22/2022    Daphne Dowling, PT 10/20/2022 1:47 PM    ________________________________________________________________________  I certify that the above Therapy Services are being furnished while the patient is under my care. I agree with the treatment plan and certify that this therapy is necessary. [] I have read the above and request that my patient continue as recommended.   [] I have read the above report and request that my patient continue therapy with the following changes/special instructions: _____________________________________________  [] I have read the above report and request that my patient be discharged from therapy    Physician's Signature:____________Date:_________TIME:________     Kojo Flores MD  ** Signature, Date and Time must be completed for valid certification **    Please sign and return to [facility] [address] [place] [address] & [facility]  [address]  [place], [address]  [phone] fax

## 2022-10-27 ENCOUNTER — HOSPITAL ENCOUNTER (OUTPATIENT)
Dept: MAMMOGRAPHY | Age: 77
Discharge: HOME | End: 2022-10-27
Attending: INTERNAL MEDICINE
Payer: MEDICARE

## 2022-10-27 DIAGNOSIS — Z12.31 VISIT FOR SCREENING MAMMOGRAM: ICD-10-CM

## 2022-10-27 PROCEDURE — 77063 BREAST TOMOSYNTHESIS BI: CPT

## 2022-10-31 ENCOUNTER — APPOINTMENT (OUTPATIENT)
Dept: PHYSICAL THERAPY | Age: 77
End: 2022-10-31
Payer: MEDICARE

## 2022-11-02 ENCOUNTER — APPOINTMENT (OUTPATIENT)
Dept: PHYSICAL THERAPY | Age: 77
End: 2022-11-02

## 2022-11-09 ENCOUNTER — TELEPHONE (OUTPATIENT)
Dept: PHYSICAL THERAPY | Age: 77
End: 2022-11-09

## 2022-11-09 ENCOUNTER — APPOINTMENT (OUTPATIENT)
Dept: PHYSICAL THERAPY | Age: 77
End: 2022-11-09

## 2022-11-09 NOTE — PROGRESS NOTES
[facility]  [address]  [place], [address]  [phone] fax    Physical Therapy Discharge Summary  Patient name: Lorri Keating Start of Care: 2022   Referral source: Eunice Hedrick MD : 1945   Medical/Treatment Diagnosis: Right knee pain [M25.561]  Payor: VA MEDICARE / Plan: VA MEDICARE PART A & B / Product Type: Medicare /  Onset Date:2022      Prior Hospitalization: see medical history Provider#: [id number]   Medications: Verified on Patient Summary List     Comorbidities: arthritis, back pain, B hip pain, COPD, depression, diabetes, neurological disease, fibromyalgia, GI disease, sleep dysfunction   Prior Level of Function: pt reports multiple falls, at least once a week for the last year  Visits from Start of Care: 7    Missed Visits: 5  Reporting Period : 2022 to 10/19/2022    Summary of Care:     Goals for this certification period to be accomplished in 4 weeks:  Pt will improve her right knee strength to 4/5 MMT to improve stability with stair negotiation. Re-cert: progressing, 4-/5 quad and HS   2. Pt will improve her right hip ext/abd strength to 4/5 MMT to improve ease of completing transfers. Re-cert: slow progress, hip ext 2+/5, hip abd 3-/5   3. Pt will improve her right knee PROM to 0-120 deg to improve gait mechanics. Re-cert: progressed to 2-607 degrees. 4. Pt will report < 3/10 knee pain to improve QOL. Re-cert: progressing, on average 5/10 pain   5. Pt will perform right LE SLS for at least 15 seconds to reduce fall risk. Re-cert: 2 seconds, pain, 30 second with finger touch assist     ASSESSMENT/RECOMMENDATIONS:  Unable to further assess progress towards goals at this time due to non-compliance/lack of attendance. DC at this time with no further instructions to the patient.   Thank you for this referral.     [x]Discontinue therapy: []Patient has reached or is progressing toward set goals      [x]Patient is non-compliant or has abdicated      []Due to lack of appreciable progress towards set goals    Gabriela Azevedo, PTA 11/9/2022 3:10 PM   Co-sign: Cara Padilla, PT, DPT 11/9/2022  5:20 PM

## 2022-11-14 ENCOUNTER — APPOINTMENT (OUTPATIENT)
Dept: PHYSICAL THERAPY | Age: 77
End: 2022-11-14

## 2022-11-16 ENCOUNTER — APPOINTMENT (OUTPATIENT)
Dept: PHYSICAL THERAPY | Age: 77
End: 2022-11-16

## 2022-11-21 ENCOUNTER — APPOINTMENT (OUTPATIENT)
Dept: PHYSICAL THERAPY | Age: 77
End: 2022-11-21

## 2022-11-23 ENCOUNTER — APPOINTMENT (OUTPATIENT)
Dept: PHYSICAL THERAPY | Age: 77
End: 2022-11-23

## 2023-03-06 ENCOUNTER — TELEPHONE (OUTPATIENT)
Age: 78
End: 2023-03-06

## 2023-03-06 RX ORDER — CLINDAMYCIN HYDROCHLORIDE 300 MG/1
300 CAPSULE ORAL ONCE AS NEEDED
Qty: 2 CAPSULE | Refills: 0 | Status: SHIPPED | OUTPATIENT
Start: 2023-03-06

## 2023-03-06 NOTE — TELEPHONE ENCOUNTER
Patient had a rt tkr on 9/23/22. She will need an antibiotic for a dental procedure coming up. Patient also states she is ALLERGIC TO AMOXICILLIN.       Pharmacy:[facility] #[id number] [place] [id number] AT [facility] [address].

## 2023-10-23 DIAGNOSIS — Z96.651 PRESENCE OF RIGHT ARTIFICIAL KNEE JOINT: Primary | ICD-10-CM

## 2023-10-31 ENCOUNTER — HOSPITAL ENCOUNTER (OUTPATIENT)
Facility: HOSPITAL | Age: 78
Discharge: HOME | End: 2023-11-03
Attending: INTERNAL MEDICINE
Payer: MEDICARE

## 2023-10-31 VITALS — WEIGHT: 168 LBS | HEIGHT: 62 IN | BODY MASS INDEX: 30.91 KG/M2

## 2023-10-31 DIAGNOSIS — Z12.31 VISIT FOR SCREENING MAMMOGRAM: ICD-10-CM

## 2023-10-31 PROCEDURE — 77063 BREAST TOMOSYNTHESIS BI: CPT

## 2023-12-06 DIAGNOSIS — Z96.651 PRESENCE OF RIGHT ARTIFICIAL KNEE JOINT: ICD-10-CM

## 2023-12-06 PROCEDURE — 73562 X-RAY EXAM OF KNEE 3: CPT | Performed by: ORTHOPAEDIC SURGERY

## 2023-12-08 ENCOUNTER — OFFICE VISIT (OUTPATIENT)
Age: 78
End: 2023-12-08
Payer: MEDICARE

## 2023-12-08 DIAGNOSIS — M25.561 RIGHT KNEE PAIN, UNSPECIFIED CHRONICITY: Primary | ICD-10-CM

## 2023-12-08 PROCEDURE — 1123F ACP DISCUSS/DSCN MKR DOCD: CPT | Performed by: ORTHOPAEDIC SURGERY

## 2023-12-08 PROCEDURE — G8427 DOCREV CUR MEDS BY ELIG CLIN: HCPCS | Performed by: ORTHOPAEDIC SURGERY

## 2023-12-08 PROCEDURE — G8399 PT W/DXA RESULTS DOCUMENT: HCPCS | Performed by: ORTHOPAEDIC SURGERY

## 2023-12-08 PROCEDURE — 99212 OFFICE O/P EST SF 10 MIN: CPT | Performed by: ORTHOPAEDIC SURGERY

## 2023-12-08 PROCEDURE — G8417 CALC BMI ABV UP PARAM F/U: HCPCS | Performed by: ORTHOPAEDIC SURGERY

## 2023-12-08 PROCEDURE — G8484 FLU IMMUNIZE NO ADMIN: HCPCS | Performed by: ORTHOPAEDIC SURGERY

## 2023-12-08 PROCEDURE — 1036F TOBACCO NON-USER: CPT | Performed by: ORTHOPAEDIC SURGERY

## 2023-12-08 PROCEDURE — 1090F PRES/ABSN URINE INCON ASSESS: CPT | Performed by: ORTHOPAEDIC SURGERY

## 2023-12-08 NOTE — PROGRESS NOTES
Name: Elisa Bahena    : 9911     [facility]  [place] [address] AND SP[facility] MEDICINE  [address], [place] [address]  Dept: [phone]  Dept Fax: [phone]     Chief Complaint   Patient presents with    Knee Pain        There were no vitals taken for this visit. Allergies   Allergen Reactions    Amoxicillin Rash        Current Outpatient Medications   Medication Sig Dispense Refill    clindamycin (CLEOCIN) 300 MG capsule Take 1 capsule by mouth 1 (one) time if needed (take both tablets one hour prior to dental appointment) Take both tablets one hour before dental procedure 2 capsule 0    carvedilol (COREG) 3.125 MG tablet ceived the following from [facility]: Outside name: carvediloL (COREG) 3.125 mg tablet      vitamin D (CHOLECALCIFEROL) 25 MCG (1000 UT) TABS tablet Take 1,000 Units by mouth daily      DULoxetine (CYMBALTA) 60 MG extended release capsule Take 60 mg by mouth daily      esomeprazole (NEXIUM) 40 MG delayed release capsule Take by mouth daily      estradiol (ESTRING) 2 MG vaginal ring ceived the following from [facility]: Outside name: Estring 2 mg (7.5 mcg /24 hour) vaginal ring      Exenatide ER (BYDUREON BCISE) 2 MG/0.85ML injection ceived the following from [facility]: Outside name: Bydureon BCise 2 mg/0.85 mL atIn      Exenatide (BYDUREON) 2 MG PEN Inject 2 mg into the skin every 7 days      levothyroxine (SYNTHROID) 100 MCG tablet ceived the following from [facility]: Outside name: levothyroxine (SYNTHROID) 100 mcg tablet      magnesium oxide (MAG-OX) 400 MG tablet Take 400 mg by mouth daily      metFORMIN (GLUCOPHAGE-XR) 500 MG extended release tablet ceived the following from [facility]: Outside name: metFORMIN ER (GLUCOPHAGE XR) 500 mg tablet      methylphenidate (RITALIN) 20 MG tablet Take 20 mg by mouth 2 times daily.

## 2024-09-23 ENCOUNTER — TRANSCRIBE ORDERS (OUTPATIENT)
Facility: HOSPITAL | Age: 79
End: 2024-09-23

## 2024-09-23 DIAGNOSIS — Z12.31 SCREENING MAMMOGRAM FOR HIGH-RISK PATIENT: Primary | ICD-10-CM

## 2025-01-06 ENCOUNTER — OFFICE VISIT (OUTPATIENT)
Age: 80
End: 2025-01-06

## 2025-01-06 VITALS — WEIGHT: 168 LBS | HEIGHT: 62 IN | BODY MASS INDEX: 30.91 KG/M2

## 2025-01-06 DIAGNOSIS — M87.111: ICD-10-CM

## 2025-01-06 DIAGNOSIS — M25.511 RIGHT SHOULDER PAIN, UNSPECIFIED CHRONICITY: Primary | ICD-10-CM

## 2025-01-06 DIAGNOSIS — M19.011 GLENOHUMERAL ARTHRITIS, RIGHT: ICD-10-CM

## 2025-01-06 DIAGNOSIS — T38.0X5A: ICD-10-CM

## 2025-01-06 RX ORDER — TRIAMCINOLONE ACETONIDE 40 MG/ML
40 INJECTION, SUSPENSION INTRA-ARTICULAR; INTRAMUSCULAR ONCE
Status: COMPLETED | OUTPATIENT
Start: 2025-01-06 | End: 2025-01-06

## 2025-01-06 RX ADMIN — TRIAMCINOLONE ACETONIDE 40 MG: 40 INJECTION, SUSPENSION INTRA-ARTICULAR; INTRAMUSCULAR at 14:54

## 2025-01-06 NOTE — PROGRESS NOTES
Bettye Mak  1945   Chief Complaint   Patient presents with    Shoulder Pain     right        HISTORY OF PRESENT ILLNESS  Bettye Mak is a 79 y.o. female who presents today for evaluation of right shoulder pain.  Pain is a 9/10. Pain has been present for years. She notes she had a broken arm a few years ago where it did not heal properly. Her pain increases with certain movement such as overhead.  She has decreased ROM.    Has tried following treatments: Injections:No; Brace:No; Therapy:No; Cane/Crutch:No      Allergies   Allergen Reactions    Amoxicillin Rash        Past Medical History:   Diagnosis Date    Adverse effect of anesthesia     woke up during hysterectomy. Placed back to sleep.    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 Caused by extreme stress    Hiatal hernia     Hypertension     Hypothyroidism     Insomnia     Lyme disease 2008    Shingles 2008    Thromboembolus ([facility]) 2021    Eliquis DC'leslie    Unspecified adverse effect of anesthesia     pt states she woke up during hysterectomy and put back to sleep.    Unspecified sleep apnea     not using cpap currently needs new mask      Social History       Tobacco History       Smoking Status  Never      Smokeless Tobacco Use  Never              Alcohol History       Alcohol Use Status  No              Drug Use       Drug Use Status  No              Sexual Activity       Sexually Active  Not Asked                   Past Surgical History:   Procedure Laterality Date    CHOLECYSTECTOMY

## 2025-04-02 ENCOUNTER — HOSPITAL ENCOUNTER (OUTPATIENT)
Facility: HOSPITAL | Age: 80
Discharge: HOME | End: 2025-04-05
Attending: INTERNAL MEDICINE
Payer: MEDICARE

## 2025-04-02 VITALS — HEIGHT: 62 IN | BODY MASS INDEX: 30.72 KG/M2

## 2025-04-02 DIAGNOSIS — Z12.31 ENCOUNTER FOR SCREENING MAMMOGRAM FOR HIGH-RISK PATIENT: ICD-10-CM

## 2025-04-02 PROCEDURE — 77063 BREAST TOMOSYNTHESIS BI: CPT

## 2025-08-05 ENCOUNTER — APPOINTMENT (OUTPATIENT)
Age: 80
End: 2025-08-05
Payer: MEDICARE

## 2025-08-05 ENCOUNTER — HOSPITAL ENCOUNTER (EMERGENCY)
Age: 80
Discharge: HOME | End: 2025-08-05
Attending: STUDENT IN AN ORGANIZED HEALTH CARE EDUCATION/TRAINING PROGRAM
Payer: MEDICARE

## 2025-08-05 VITALS
SYSTOLIC BLOOD PRESSURE: 122 MMHG | TEMPERATURE: 97.4 F | OXYGEN SATURATION: 95 % | DIASTOLIC BLOOD PRESSURE: 63 MMHG | BODY MASS INDEX: 29.44 KG/M2 | RESPIRATION RATE: 16 BRPM | WEIGHT: 160 LBS | HEIGHT: 62 IN | HEART RATE: 98 BPM

## 2025-08-05 DIAGNOSIS — M17.12 OSTEOARTHRITIS OF LEFT KNEE, UNSPECIFIED OSTEOARTHRITIS TYPE: ICD-10-CM

## 2025-08-05 DIAGNOSIS — M25.562 ACUTE PAIN OF LEFT KNEE: Primary | ICD-10-CM

## 2025-08-05 PROCEDURE — 6370000000 HC RX 637 (ALT 250 FOR IP): Performed by: STUDENT IN AN ORGANIZED HEALTH CARE EDUCATION/TRAINING PROGRAM

## 2025-08-05 PROCEDURE — 94761 N-INVAS EAR/PLS OXIMETRY MLT: CPT

## 2025-08-05 PROCEDURE — 96372 THER/PROPH/DIAG INJ SC/IM: CPT

## 2025-08-05 PROCEDURE — 6360000002 HC RX W HCPCS: Performed by: STUDENT IN AN ORGANIZED HEALTH CARE EDUCATION/TRAINING PROGRAM

## 2025-08-05 PROCEDURE — 99283 EMERGENCY DEPT VISIT LOW MDM: CPT

## 2025-08-05 PROCEDURE — 73562 X-RAY EXAM OF KNEE 3: CPT

## 2025-08-05 RX ORDER — KETOROLAC TROMETHAMINE 15 MG/ML
30 INJECTION, SOLUTION INTRAMUSCULAR; INTRAVENOUS ONCE
Status: COMPLETED | OUTPATIENT
Start: 2025-08-05 | End: 2025-08-05

## 2025-08-05 RX ORDER — OXYCODONE AND ACETAMINOPHEN 5; 325 MG/1; MG/1
1 TABLET ORAL
Refills: 0 | Status: COMPLETED | OUTPATIENT
Start: 2025-08-05 | End: 2025-08-05

## 2025-08-05 RX ORDER — OXYCODONE AND ACETAMINOPHEN 5; 325 MG/1; MG/1
1 TABLET ORAL EVERY 6 HOURS PRN
Qty: 9 TABLET | Refills: 0 | Status: SHIPPED | OUTPATIENT
Start: 2025-08-05 | End: 2025-08-08

## 2025-08-05 RX ADMIN — KETOROLAC TROMETHAMINE 30 MG: 15 INJECTION, SOLUTION INTRAMUSCULAR; INTRAVENOUS at 14:01

## 2025-08-05 RX ADMIN — OXYCODONE HYDROCHLORIDE AND ACETAMINOPHEN 1 TABLET: 5; 325 TABLET ORAL at 14:05

## 2025-08-05 ASSESSMENT — PAIN SCALES - GENERAL
PAINLEVEL_OUTOF10: 10
PAINLEVEL_OUTOF10: 9
PAINLEVEL_OUTOF10: 9

## 2025-08-05 ASSESSMENT — PAIN - FUNCTIONAL ASSESSMENT: PAIN_FUNCTIONAL_ASSESSMENT: 0-10

## 2025-08-05 ASSESSMENT — LIFESTYLE VARIABLES
HOW OFTEN DO YOU HAVE A DRINK CONTAINING ALCOHOL: NEVER
HOW MANY STANDARD DRINKS CONTAINING ALCOHOL DO YOU HAVE ON A TYPICAL DAY: PATIENT DOES NOT DRINK

## 2025-08-05 ASSESSMENT — PAIN DESCRIPTION - ORIENTATION
ORIENTATION: LEFT
ORIENTATION: LEFT

## 2025-08-05 ASSESSMENT — PAIN DESCRIPTION - LOCATION
LOCATION: KNEE
LOCATION: KNEE

## 2025-08-07 ENCOUNTER — OFFICE VISIT (OUTPATIENT)
Age: 80
End: 2025-08-07

## 2025-08-07 DIAGNOSIS — M17.12 OSTEOARTHRITIS OF LEFT KNEE, UNSPECIFIED OSTEOARTHRITIS TYPE: Primary | ICD-10-CM

## 2025-08-07 RX ORDER — TRIAMCINOLONE ACETONIDE 40 MG/ML
40 INJECTION, SUSPENSION INTRA-ARTICULAR; INTRAMUSCULAR ONCE
Status: COMPLETED | OUTPATIENT
Start: 2025-08-07 | End: 2025-08-07

## 2025-08-07 RX ORDER — LIDOCAINE HYDROCHLORIDE 10 MG/ML
9 INJECTION, SOLUTION INFILTRATION; PERINEURAL ONCE
Status: COMPLETED | OUTPATIENT
Start: 2025-08-07 | End: 2025-08-07

## 2025-08-07 RX ADMIN — TRIAMCINOLONE ACETONIDE 40 MG: 40 INJECTION, SUSPENSION INTRA-ARTICULAR; INTRAMUSCULAR at 16:11

## 2025-08-07 RX ADMIN — LIDOCAINE HYDROCHLORIDE 9 ML: 10 INJECTION, SOLUTION INFILTRATION; PERINEURAL at 16:11

## (undated) DEVICE — FLEX ADVANTAGE 3000CC: Brand: FLEX ADVANTAGE

## (undated) DEVICE — BOUGIE ESOPH MLNY 54 FR TAPR TIP TUNGSTEN FILLED SIL NS

## (undated) DEVICE — GLOVE ORANGE PI 8 1/2   MSG9085

## (undated) DEVICE — FCPS RAD JAW 4LC 240CM W/NDL -- BX/20 RADIAL JAW 4

## (undated) DEVICE — SYR 50ML SLIP TIP NSAF LF STRL --

## (undated) DEVICE — STERILE POLYISOPRENE POWDER-FREE SURGICAL GLOVES: Brand: PROTEXIS

## (undated) DEVICE — SUTURE VCRL SZ 3-0 L27IN ABSRB UD L24MM PS-1 3/8 CIR PRIM J936H

## (undated) DEVICE — GARMENT COMPR M FOR 13IN FT INTMIT SGL BLDR HEM FORC II

## (undated) DEVICE — AIRLIFE™ NASAL OXYGEN CANNULA CURVED, FLARED TIP WITH 14 FOOT (4.3 M) CRUSH-RESISTANT TUBING, OVER-THE-EAR STYLE: Brand: AIRLIFE™

## (undated) DEVICE — GLOVE ORANGE PI 8   MSG9080

## (undated) DEVICE — BLADE ELECTRODE: Brand: VALLEYLAB

## (undated) DEVICE — ATTUNE SOLO PINNING SYSTEM

## (undated) DEVICE — CANNULA ORIG TL CLR W FOAM CUSHIONS AND 14FT SUPL TB 3 CHN

## (undated) DEVICE — CATHETER SUCT TR FL TIP 14FR W/ O CTRL

## (undated) DEVICE — INTENDED FOR TISSUE SEPARATION, AND OTHER PROCEDURES THAT REQUIRE A SHARP SURGICAL BLADE TO PUNCTURE OR CUT.: Brand: BARD-PARKER SAFETY BLADES SIZE 10, STERILE

## (undated) DEVICE — SYRINGE MED 3ML NDL 22GA L1IN PLAS N CTRL LUERLOCK TIP REG

## (undated) DEVICE — BLADE SAW W12.5XL70MM THK1MM RECIP DBL SIDE OFFSET

## (undated) DEVICE — STRYKER PERFORMANCE SERIES SAGITTAL BLADE: Brand: STRYKER PERFORMANCE SERIES

## (undated) DEVICE — SYRINGE MED 25GA 3ML L5/8IN SUBQ PLAS W/ DETACH NDL SFTY

## (undated) DEVICE — FLEX ADVANTAGE 1500CC: Brand: FLEX ADVANTAGE

## (undated) DEVICE — BASIN EMESIS 500CC ROSE 250/CS 60/PLT: Brand: MEDEGEN MEDICAL PRODUCTS, LLC

## (undated) DEVICE — SPONGE LAP W18XL18IN WHT COT 4 PLY FLD STRUNG RADPQ DISP ST

## (undated) DEVICE — SUTURE VCRL + SZ 1 L27IN ANTIBACTERIAL POLYGLACTIN 910 W VCP281H

## (undated) DEVICE — (D)HANDPIECE IRR W/HI FLO TIP -- DUPLICATE USE ITEM 121586

## (undated) DEVICE — FORCEPS ENDOSCP BX L230CM DIA2.8MM ALGTR CUP SPEC RETRV GI

## (undated) DEVICE — GOWN,AURORA,FABRIC-REINFORCED,X-LARGE: Brand: MEDLINE

## (undated) DEVICE — BASIC SINGLE BASIN-LF: Brand: MEDLINE INDUSTRIES, INC.

## (undated) DEVICE — SOLUTION IRRIG 1000ML H2O STRL BLT

## (undated) DEVICE — 3M™ TEGADERM™ HP TRANSPARENT FILM DRESSING FRAME STYLE, 9546HP, 4 IN X 4-1/2 IN (10 CM X 11.5 CM), 50/CT 4CT/CASE: Brand: 3M™ TEGADERM™

## (undated) DEVICE — MEDI-VAC SUCTION HIGH CAPACITY: Brand: CARDINAL HEALTH

## (undated) DEVICE — (D)SYR 10ML 1/5ML GRAD NSAF -- PKGING CHANGE USE ITEM 338027

## (undated) DEVICE — TOWEL,OR,DSP,ST,BLUE,STD,4/PK,20PK/CS: Brand: MEDLINE

## (undated) DEVICE — SKIN CLOS DERMABND PRINEO 60CM -- DERMABOUND PRINEO

## (undated) DEVICE — BITE BLOCK ENDOSCP UNIV AD 6 TO 9.4 MM

## (undated) DEVICE — BASIN EMSIS 16OZ GRAPHITE PLAS KID SHP MOLD GRAD FOR ORAL

## (undated) DEVICE — POWDER HEMOSTAT GEL 3.0GR -- SURGICEL

## (undated) DEVICE — POWDER SURG CELLERATE RX 1 GM HYDROL COLLEGEN

## (undated) DEVICE — GLOVE SURG 7 BIOGEL PI ULTRATOUCH G

## (undated) DEVICE — Device

## (undated) DEVICE — ENDOSCOPY PUMP TUBING/ CAP SET: Brand: ERBE

## (undated) DEVICE — DRESSING ANTIMIC FOAM OPTIFOAM POSTOP ADH 4X14 IN

## (undated) DEVICE — FLUFF AND POLYMER UNDERPAD,EXTRA HEAVY: Brand: WINGS

## (undated) DEVICE — WRAP KNEE UNIV E STRP HK AND LOOP W/ GEL PK MEDCOOL

## (undated) DEVICE — 4-PORT MANIFOLD: Brand: NEPTUNE 2

## (undated) DEVICE — GAUZE,SPONGE,4"X4",16PLY,STRL,LF,10/TRAY: Brand: MEDLINE

## (undated) DEVICE — YANKAUER,SMOOTH HANDLE,HIGH CAPACITY: Brand: MEDLINE INDUSTRIES, INC.

## (undated) DEVICE — AIRLIFE™ NASAL OXYGEN CANNULA CURVED, NONFLARED TIP WITH 14 FOOT (4.3 M) CRUSH-RESISTANT TUBING, OVER-THE-EAR STYLE: Brand: AIRLIFE™

## (undated) DEVICE — CANNULA NSL AD TBNG L14FT STD PVC O2 CRV CONN NONFLARED NSL

## (undated) DEVICE — HOOD WITH PEEL AWAY FACE SHIELD: Brand: T7PLUS

## (undated) DEVICE — SUT VCRL + 2-0 27IN CT2 UD -- 36/BX

## (undated) DEVICE — TUBING, SUCTION, 9/32" X 10', STRAIGHT: Brand: MEDLINE

## (undated) DEVICE — ZIMMER® STERILE DISPOSABLE TOURNIQUET CUFF WITH PLC, DUAL PORT, SINGLE BLADDER, 34 IN. (86 CM)

## (undated) DEVICE — MEDI-VAC NON-CONDUCTIVE SUCTION TUBING: Brand: CARDINAL HEALTH

## (undated) DEVICE — THE STERILE LIGHT HANDLE COVER IS USED WITH STERIS SURGICAL LIGHTING AND VISUALIZATION SYSTEMS.

## (undated) DEVICE — 3M™ TEGADERM™ HP TRANSPARENT FILM DRESSING FRAME STYLE, 9534HP, 2-3/8 X 2-3/4 IN (6 CM X 7 CM), 100/CT 4CT/CASE: Brand: 3M™ TEGADERM™

## (undated) DEVICE — TOTAL KNEE PACK: Brand: MEDLINE INDUSTRIES, INC.

## (undated) DEVICE — DRESSING,GAUZE,XEROFORM,CURAD,1"X8",ST: Brand: CURAD

## (undated) DEVICE — DRAPE,TOP,102X53,STERILE: Brand: MEDLINE

## (undated) DEVICE — HYPODERMIC SAFETY NEEDLE: Brand: MAGELLAN

## (undated) DEVICE — GOWN ISOL IMPERV UNIV, DISP, OPEN BACK, BLUE --

## (undated) DEVICE — CATH IV SAFE STR 22GX1IN BLU -- PROTECTIV PLUS

## (undated) DEVICE — GOWN,PRECEPT,XLNG/XXLARGE,STRL: Brand: MEDLINE

## (undated) DEVICE — BOWL MIXING VAC PALABOWL PALACOS

## (undated) DEVICE — SYR 10ML LUER LOK 1/5ML GRAD --

## (undated) DEVICE — BNDG,ELSTC,MATRIX,STRL,6"X5YD,LF,HOOK&LP: Brand: MEDLINE

## (undated) DEVICE — PREP SKN CHLRAPRP APL 26ML STR --

## (undated) DEVICE — COVER,TABLE,HEAVY DUTY,77"X90",STRL: Brand: MEDLINE

## (undated) DEVICE — 3M™ IOBAN™ 2 ANTIMICROBIAL INCISE DRAPE 6650EZ: Brand: IOBAN™ 2

## (undated) DEVICE — GOWN,PRECEPT, XLNG/XLRG ,STRL: Brand: MEDLINE

## (undated) DEVICE — GLOVE SURG SZ 65 THK91MIL LTX FREE SYN POLYISOPRENE

## (undated) DEVICE — GLOVE SURG SZ 65 L12IN FNGR THK79MIL GRN LTX FREE

## (undated) DEVICE — SHEET,DRAPE,70X100,STERILE: Brand: MEDLINE

## (undated) DEVICE — GLOVE SURG UNDERGLOVE 7.5 PF BLU BIOGEL PI MIC LF

## (undated) DEVICE — 450 ML BOTTLE OF 0.05% CHLORHEXIDINE GLUCONATE IN 99.95% STERILE WATER FOR IRRIGATION, USP AND APPLICATOR.: Brand: IRRISEPT ANTIMICROBIAL WOUND LAVAGE

## (undated) DEVICE — SYR 20ML LL STRL LF --

## (undated) DEVICE — TUBING SUCT 10FR MAL ALUM SHFT FN CAP VENT UNIV CONN W/ OBT

## (undated) DEVICE — SUTURE VCRL + SZ 0 L27IN ANTIBACTERIAL POLYGLACTIN 910 W VCP280H

## (undated) DEVICE — BANDAGE COBAN 4 IN COMPR W4INXL5YD FOAM COHESIVE QUIK STK SELF ADH SFT

## (undated) DEVICE — SOL IRR NACL 0.9% 500ML POUR --

## (undated) DEVICE — LINER SUCT CANSTR 3000CC PLAS SFT PRE ASSEMB W/OUT TBNG W/